# Patient Record
Sex: MALE | Race: BLACK OR AFRICAN AMERICAN | Employment: FULL TIME | URBAN - METROPOLITAN AREA
[De-identification: names, ages, dates, MRNs, and addresses within clinical notes are randomized per-mention and may not be internally consistent; named-entity substitution may affect disease eponyms.]

---

## 2017-07-08 ENCOUNTER — APPOINTMENT (OUTPATIENT)
Dept: GENERAL RADIOLOGY | Age: 32
End: 2017-07-08
Attending: PHYSICIAN ASSISTANT
Payer: COMMERCIAL

## 2017-07-08 ENCOUNTER — HOSPITAL ENCOUNTER (EMERGENCY)
Age: 32
Discharge: HOME | End: 2017-07-08
Attending: EMERGENCY MEDICINE
Payer: COMMERCIAL

## 2017-07-08 VITALS
WEIGHT: 252.43 LBS | OXYGEN SATURATION: 95 % | TEMPERATURE: 97.9 F | BODY MASS INDEX: 38.38 KG/M2 | HEART RATE: 102 BPM | RESPIRATION RATE: 16 BRPM | DIASTOLIC BLOOD PRESSURE: 94 MMHG | SYSTOLIC BLOOD PRESSURE: 139 MMHG
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
## 2017-07-08 DIAGNOSIS — B35.3 TINEA PEDIS OF LEFT FOOT: ICD-10-CM

## 2017-07-08 LAB
ALBUMIN SERPL BCP-MCNC: 3.8 G/DL (ref 3.5–5)
ALBUMIN/GLOB SERPL: 1 {RATIO} (ref 1.1–2.2)
ALP SERPL-CCNC: 98 U/L (ref 45–117)
ALT SERPL-CCNC: 40 U/L (ref 12–78)
ANION GAP BLD CALC-SCNC: 5 MMOL/L (ref 5–15)
AST SERPL W P-5'-P-CCNC: 21 U/L (ref 15–37)
BASOPHILS # BLD AUTO: 0 K/UL (ref 0–0.1)
BASOPHILS # BLD: 0 % (ref 0–1)
BILIRUB SERPL-MCNC: 0.3 MG/DL (ref 0.2–1)
BUN SERPL-MCNC: 11 MG/DL (ref 6–20)
BUN/CREAT SERPL: 9 (ref 12–20)
CALCIUM SERPL-MCNC: 8.7 MG/DL (ref 8.5–10.1)
CHLORIDE SERPL-SCNC: 103 MMOL/L (ref 97–108)
CO2 SERPL-SCNC: 30 MMOL/L (ref 21–32)
CREAT SERPL-MCNC: 1.25 MG/DL (ref 0.7–1.3)
EOSINOPHIL # BLD: 0.2 K/UL (ref 0–0.4)
EOSINOPHIL NFR BLD: 5 % (ref 0–7)
ERYTHROCYTE [DISTWIDTH] IN BLOOD BY AUTOMATED COUNT: 14.6 % (ref 11.5–14.5)
GLOBULIN SER CALC-MCNC: 4 G/DL (ref 2–4)
GLUCOSE SERPL-MCNC: 124 MG/DL (ref 65–100)
HCT VFR BLD AUTO: 38.9 % (ref 36.6–50.3)
HGB BLD-MCNC: 12.9 G/DL (ref 12.1–17)
LYMPHOCYTES # BLD AUTO: 43 % (ref 12–49)
LYMPHOCYTES # BLD: 2.2 K/UL (ref 0.8–3.5)
MCH RBC QN AUTO: 27.7 PG (ref 26–34)
MCHC RBC AUTO-ENTMCNC: 33.2 G/DL (ref 30–36.5)
MCV RBC AUTO: 83.5 FL (ref 80–99)
MONOCYTES # BLD: 0.5 K/UL (ref 0–1)
MONOCYTES NFR BLD AUTO: 9 % (ref 5–13)
NEUTS SEG # BLD: 2.2 K/UL (ref 1.8–8)
NEUTS SEG NFR BLD AUTO: 43 % (ref 32–75)
PLATELET # BLD AUTO: 294 K/UL (ref 150–400)
POTASSIUM SERPL-SCNC: 3.6 MMOL/L (ref 3.5–5.1)
PROT SERPL-MCNC: 7.8 G/DL (ref 6.4–8.2)
RBC # BLD AUTO: 4.66 M/UL (ref 4.1–5.7)
SODIUM SERPL-SCNC: 138 MMOL/L (ref 136–145)
WBC # BLD AUTO: 5.1 K/UL (ref 4.1–11.1)

## 2017-07-08 PROCEDURE — 80053 COMPREHEN METABOLIC PANEL: CPT | Performed by: PHYSICIAN ASSISTANT

## 2017-07-08 PROCEDURE — 90715 TDAP VACCINE 7 YRS/> IM: CPT | Performed by: PHYSICIAN ASSISTANT

## 2017-07-08 PROCEDURE — 75810000289 HC I&D ABSCESS SIMP/COMP/MULT

## 2017-07-08 PROCEDURE — 77030019895 HC PCKNG STRP IODO -A

## 2017-07-08 PROCEDURE — 85025 COMPLETE CBC W/AUTO DIFF WBC: CPT | Performed by: PHYSICIAN ASSISTANT

## 2017-07-08 PROCEDURE — 90471 IMMUNIZATION ADMIN: CPT

## 2017-07-08 PROCEDURE — 99282 EMERGENCY DEPT VISIT SF MDM: CPT

## 2017-07-08 PROCEDURE — 96365 THER/PROPH/DIAG IV INF INIT: CPT

## 2017-07-08 PROCEDURE — 36415 COLL VENOUS BLD VENIPUNCTURE: CPT | Performed by: PHYSICIAN ASSISTANT

## 2017-07-08 PROCEDURE — 74011250636 HC RX REV CODE- 250/636: Performed by: PHYSICIAN ASSISTANT

## 2017-07-08 PROCEDURE — 96372 THER/PROPH/DIAG INJ SC/IM: CPT

## 2017-07-08 PROCEDURE — 96375 TX/PRO/DX INJ NEW DRUG ADDON: CPT

## 2017-07-08 PROCEDURE — 73630 X-RAY EXAM OF FOOT: CPT

## 2017-07-08 PROCEDURE — 74011000258 HC RX REV CODE- 258: Performed by: PHYSICIAN ASSISTANT

## 2017-07-08 RX ORDER — CHLORPHENIRAMINE MALEATE 4 MG
TABLET ORAL 2 TIMES DAILY
Qty: 1 TUBE | Refills: 0 | Status: SHIPPED | OUTPATIENT
Start: 2017-07-08 | End: 2017-08-07

## 2017-07-08 RX ORDER — PROCHLORPERAZINE EDISYLATE 5 MG/ML
10 INJECTION INTRAMUSCULAR; INTRAVENOUS
Status: COMPLETED | OUTPATIENT
Start: 2017-07-08 | End: 2017-07-08

## 2017-07-08 RX ORDER — OXYCODONE AND ACETAMINOPHEN 5; 325 MG/1; MG/1
1 TABLET ORAL
Qty: 12 TAB | Refills: 0 | Status: SHIPPED | OUTPATIENT
Start: 2017-07-08 | End: 2018-02-12 | Stop reason: ALTCHOICE

## 2017-07-08 RX ADMIN — TETANUS TOXOID, REDUCED DIPHTHERIA TOXOID AND ACELLULAR PERTUSSIS VACCINE, ADSORBED 0.5 ML: 5; 2.5; 8; 8; 2.5 SUSPENSION INTRAMUSCULAR at 12:23

## 2017-07-08 RX ADMIN — CEFTRIAXONE 1 G: 1 INJECTION, POWDER, FOR SOLUTION INTRAMUSCULAR; INTRAVENOUS at 12:52

## 2017-07-08 RX ADMIN — PROCHLORPERAZINE EDISYLATE 10 MG: 5 INJECTION INTRAMUSCULAR; INTRAVENOUS at 12:52

## 2017-07-08 RX ADMIN — METHYLPREDNISOLONE SODIUM SUCCINATE 125 MG: 125 INJECTION, POWDER, FOR SOLUTION INTRAMUSCULAR; INTRAVENOUS at 12:24

## 2017-07-08 NOTE — ED NOTES
ACE wrap applied by PA; VSS; neuro intact; received discharge instructions from PA;declined wheelchair assistance to car

## 2017-07-08 NOTE — LETTER
[address] 
[facility] EMERGENCY DEPT 
[address] [id number] [phone] Work/School Note Date: 7/8/2017 To Whom It May concern: 
 
Josef Beaulieu was seen and treated today in the emergency room by the following provider(s): 
Attending Provider: Ben Hightower. Yumiko Chacon MD 
Physician Assistant: JERICA Prater. [facility] hours. Sincerely, JERICA Prater

## 2017-07-08 NOTE — DISCHARGE INSTRUCTIONS
Athlete's Foot: Care Instructions  Your Care Instructions  Athlete's foot is an itchy rash on the foot caused by an infection with a fungus. You can get it by going barefoot in wet public areas, such as swimming pools or locker rooms. Many times there is no clear reason why you get athlete's foot. You can easily treat athlete's foot by putting medicine on your feet for 1 to 6 weeks. In some cases, a doctor may prescribe pills to kill the fungus. Follow-up care is a key part of your treatment and safety. Be sure to make and go to all appointments, and call your doctor if you are having problems. It's also a good idea to know your test results and keep a list of the medicines you take. How can you care for yourself at home? · Your doctor may suggest an over-the counter lotion or spray or may prescribe a medicine. Take your medicines exactly as prescribed. Call your doctor if you think you are having a problem with your medicine. · Keep your feet clean and dry. · When you get dressed, put your socks on before your underwear. This can prevent the fungus from spreading from your feet to your groin. To prevent athlete's foot  · Wear flip-flops or other shower sandals in public locker rooms and showers and by the pool. · Dry between your toes after swimming or bathing. · Wear leather shoes or sandals, which let air get to your feet. · Change your socks as needed so your feet stay as dry as possible. · Use antifungal powder on your feet. When should you call for help? Watch closely for changes in your health, and be sure to contact your doctor if:  · You do not get better as expected. Where can you learn more? Go to http://toshia-joe.info/. Enter M498 in the search box to learn more about \"Athlete's Foot: Care Instructions. \"  Current as of: October 13, 2016  Content Version: 11.3  © 6284-9028 [employer].  Care instructions adapted under license by [employer] (which disclaims liability or warranty for this information). If you have questions about a medical condition or this instruction, always ask your healthcare professional. [address] any warranty or liability for your use of this information.

## 2017-07-08 NOTE — ED PROVIDER NOTES
HPI Comments: Carrie Ruvalcaba, 28 y.o. male, with no significant PMHx presents ambulatory to [facility] ED with cc of insect bite to left foot present since 4 days ago. Pt states that the bite was originally a blister but has since \"popped\". He notes increased pain upon ambulating. He remarks clear discharge from the area. He notes that he went to [facility] and received TMP/SMX antibiotics, for which he is on day 2. He is not UTD on his tetanus shot. PCP: None    Social history significant for: + Tobacco, - EtOH, + Illicit Drug Use    There are no other complaints, changes, or physical findings at this time. The history is provided by the patient. No  was used. Past Medical History:   Diagnosis Date    Headache        No past surgical history on file. Family History:   Problem Relation Age of Onset    Hypertension Mother     Hypertension Father     No Known Problems Sister     No Known Problems Brother        Social History     Social History    Marital status: SINGLE     Spouse name: N/A    Number of children: N/A    Years of education: N/A     Occupational History    Not on file. Social History Main Topics    Smoking status: Current Every Day Smoker    Smokeless tobacco: Never Used    Alcohol use No      Comment: Rarely. Smokes cigar's one a day    Drug use: Yes     Special: Marijuana      Comment: once a day    Sexual activity: Yes     Partners: Female     Other Topics Concern    Not on file     Social History Narrative    No narrative on file         ALLERGIES: Review of patient's allergies indicates no known allergies. Review of Systems   Constitutional: Negative for activity change, chills and fever. HENT: Negative for congestion and sore throat. Eyes: Negative for pain and redness. Respiratory: Negative for cough, chest tightness and shortness of breath. Cardiovascular: Negative for chest pain and palpitations.    Gastrointestinal: Negative for abdominal pain, diarrhea, nausea and vomiting. Genitourinary: Negative for dysuria, frequency and urgency. Musculoskeletal: Negative for back pain and neck pain. Skin: Positive for wound (blister-like insect bite on left foot). Negative for rash. Neurological: Negative for syncope, light-headedness and headaches. Psychiatric/Behavioral: Negative for confusion. All other systems reviewed and are negative. Patient Vitals for the past 12 hrs:   Temp Pulse Resp BP SpO2   07/08/17 1210 97.9 °F (36.6 °C) (!) 102 16 (!) 139/94 95 %       Physical Exam   Constitutional: He is oriented to person, place, and time. He appears well-developed and well-nourished. No distress. HENT:   Head: Normocephalic and atraumatic. Right Ear: External ear normal.   Left Ear: External ear normal.   Nose: Nose normal.   Mouth/Throat: Oropharynx is clear and moist. No oropharyngeal exudate. Eyes: Conjunctivae and EOM are normal. Pupils are equal, round, and reactive to light. Right eye exhibits no discharge. Left eye exhibits no discharge. No scleral icterus. Neck: Normal range of motion. Neck supple. No JVD present. No tracheal deviation present. Cardiovascular: Normal rate, regular rhythm, normal heart sounds and intact distal pulses. Exam reveals no gallop and no friction rub. No murmur heard. Pulmonary/Chest: Effort normal and breath sounds normal. No respiratory distress. He has no wheezes. He has no rales. He exhibits no tenderness. Abdominal: Soft. Bowel sounds are normal. He exhibits no distension and no mass. There is no tenderness. There is no rebound and no guarding. Musculoskeletal: Normal range of motion. He exhibits no edema or tenderness. Lymphadenopathy:     He has no cervical adenopathy. Neurological: He is alert and oriented to person, place, and time. He has normal reflexes. No cranial nerve deficit. He exhibits normal muscle tone.  Coordination normal.   Skin: Skin is warm and dry. He is not diaphoretic.   + Unroofed blister on dorsal aspect of left distal foot  + Swelling  No cellulitis, no drainage, no tissue necrosis  + Interdigital tinea pedis, between toes at the bases. Psychiatric: He has a normal mood and affect. His behavior is normal. Judgment and thought content normal.   Nursing note and vitals reviewed. MDM  Number of Diagnoses or Management Options  Insect bite, initial encounter:   Tinea pedis of left foot:   Diagnosis management comments: DDx: insect bite, blister, tinea pedis, secondary infection. Amount and/or Complexity of Data Reviewed  Clinical lab tests: ordered and reviewed  Tests in the radiology section of CPT®: ordered and reviewed    Patient Progress  Patient progress: stable    ED Course       Procedures     Procedure Note - Incision and Drainage:   1:32 PM  Performed by: Kendrick Wheeler PA-C   Complexity: simple  Skin prepped with Hibiclens. Sterile field established. Anesthesia achieved with lidocaine plain. Abscess to foot: left was incised with # 11 blade, and small amount of green drainage was expressed. Wound probed and irrigated. Area was packed using quarter inch iodoform gauze. Sterile dressing applied. Estimated blood loss: less than 5cc  The procedure took 1-15 minutes, and pt tolerated well. Written by Nicole Valentine, ED Scribe, as dictated by Kendrick Wheeler PA-C. Procedure Note - Ace Wrap Placement:  1:32 PM  Performed by: Kendrick Wheeler PA-C   Neurovascularly intact prior to tx. An Ace Wrap was placed on pt's left foot. Joint was placed in neutral position. Neurovascularly intact after tx. The procedure took 1-15 minutes, and pt tolerated well.   Written by Nicole Valentine ED Scribe, as dictated by Kendrick Wheeler PA-C.    LABORATORY TESTS:  Recent Results (from the past 12 hour(s))   CBC WITH AUTOMATED DIFF    Collection Time: 07/08/17 12:20 PM   Result Value Ref Range    WBC 5.1 4.1 - 11.1 K/uL RBC 4.66 4.10 - 5.70 M/uL    HGB 12.9 12.1 - 17.0 g/dL    HCT 38.9 36.6 - 50.3 %    MCV 83.5 80.0 - 99.0 FL    MCH 27.7 26.0 - 34.0 PG    MCHC 33.2 30.0 - 36.5 g/dL    RDW 14.6 (H) 11.5 - 14.5 %    PLATELET 815 129 - 288 K/uL    NEUTROPHILS 43 32 - 75 %    LYMPHOCYTES 43 12 - 49 %    MONOCYTES 9 5 - 13 %    EOSINOPHILS 5 0 - 7 %    BASOPHILS 0 0 - 1 %    ABS. NEUTROPHILS 2.2 1.8 - 8.0 K/UL    ABS. LYMPHOCYTES 2.2 0.8 - 3.5 K/UL    ABS. MONOCYTES 0.5 0.0 - 1.0 K/UL    ABS. EOSINOPHILS 0.2 0.0 - 0.4 K/UL    ABS. BASOPHILS 0.0 0.0 - 0.1 K/UL   METABOLIC PANEL, COMPREHENSIVE    Collection Time: 07/08/17 12:20 PM   Result Value Ref Range    Sodium 138 136 - 145 mmol/L    Potassium 3.6 3.5 - 5.1 mmol/L    Chloride 103 97 - 108 mmol/L    CO2 30 21 - 32 mmol/L    Anion gap 5 5 - 15 mmol/L    Glucose 124 (H) 65 - 100 mg/dL    BUN 11 6 - 20 MG/DL    Creatinine 1.25 0.70 - 1.30 MG/DL    BUN/Creatinine ratio 9 (L) 12 - 20      GFR est AA >60 >60 ml/min/1.73m2    GFR est non-AA >60 >60 ml/min/1.73m2    Calcium 8.7 8.5 - 10.1 MG/DL    Bilirubin, total 0.3 0.2 - 1.0 MG/DL    ALT (SGPT) 40 12 - 78 U/L    AST (SGOT) 21 15 - 37 U/L    Alk. phosphatase 98 45 - 117 U/L    Protein, total 7.8 6.4 - 8.2 g/dL    Albumin 3.8 3.5 - 5.0 g/dL    Globulin 4.0 2.0 - 4.0 g/dL    A-G Ratio 1.0 (L) 1.1 - 2.2         IMAGING RESULTS:  XR FOOT LT MIN 3 V   Final Result   INDICATION: ulcer l foot dorsal and distal      Exam: AP, lateral, oblique views of the left foot.     FINDINGS: There is no acute fracture or dislocation. There is no plain  radiographic evidence of osteomyelitis. Bones are well-mineralized. No gas is  seen within the soft tissues. No radiodense foreign body is seen.     IMPRESSION  IMPRESSION: No acute fracture or dislocation. MR can be performed for further  evaluation, if indicated.        MEDICATIONS GIVEN:  Medications   diph,Pertuss(AC),Tet Vac-PF (BOOSTRIX) suspension 0.5 mL (0.5 mL IntraMUSCular Given 7/8/17 1223) cefTRIAXone (ROCEPHIN) 1 g in 0.9% sodium chloride (MBP/ADV) 50 mL (0 g IntraVENous IV Completed 7/8/17 1335)   methylPREDNISolone (PF) (SOLU-MEDROL) injection 125 mg (125 mg IntraVENous Given 7/8/17 1224)   prochlorperazine (COMPAZINE) injection 10 mg (10 mg IntraMUSCular Given 7/8/17 1252)       IMPRESSION:  1. Insect bite, initial encounter    2. Tinea pedis of left foot        PLAN:  1. Discharge   Current Discharge Medication List      START taking these medications    Details   oxyCODONE-acetaminophen (PERCOCET) 5-325 mg per tablet Take 1 Tab by mouth every six (6) hours as needed for Pain. Max Daily Amount: 4 Tabs. Qty: 12 Tab, Refills: 0      clotrimazole (LOTRIMIN) 1 % topical cream Apply  to affected area two (2) times a day for 30 days. Apply twice a day for 2-4 weeks  Qty: 1 Tube, Refills: 0           2. Follow-up Information     Follow up With Details Comments Contact Info    None In 2 days As needed None ([phone]) Patient stated that they have no PCP      Edwin Abraham DPM In 2 days For wound re-check [address] [place] [address]  [phone]          Return to ED if worse     Discharge Note:  1:42 PM  The pt is ready for discharge. The pt's signs, symptoms, diagnosis, and discharge instructions have been discussed and pt has conveyed their understanding. The pt is to follow up as recommended or return to ER should their symptoms worsen. Plan has been discussed and pt is in agreement. This note is prepared by Rachael Jalloh, acting as a Scribe for [employer] . [employer] : The scribe's documentation has been prepared under my direction and personally reviewed by me in its entirety. I confirm that the notes above accurately reflects all work, treatment, procedures, and medical decision making performed by me.

## 2018-02-12 ENCOUNTER — OFFICE VISIT (OUTPATIENT)
Dept: FAMILY MEDICINE CLINIC | Age: 33
End: 2018-02-12

## 2018-02-12 VITALS
TEMPERATURE: 97.1 F | HEIGHT: 68 IN | SYSTOLIC BLOOD PRESSURE: 130 MMHG | BODY MASS INDEX: 40.47 KG/M2 | HEART RATE: 82 BPM | OXYGEN SATURATION: 100 % | WEIGHT: 267 LBS | DIASTOLIC BLOOD PRESSURE: 88 MMHG | RESPIRATION RATE: 20 BRPM

## 2018-02-12 DIAGNOSIS — Z13.29 SCREENING FOR THYROID DISORDER: ICD-10-CM

## 2018-02-12 DIAGNOSIS — Z13.1 SCREENING FOR DIABETES MELLITUS (DM): ICD-10-CM

## 2018-02-12 DIAGNOSIS — E55.9 HYPOVITAMINOSIS D: ICD-10-CM

## 2018-02-12 DIAGNOSIS — Z13.220 SCREENING CHOLESTEROL LEVEL: ICD-10-CM

## 2018-02-12 DIAGNOSIS — Z11.3 SCREEN FOR STD (SEXUALLY TRANSMITTED DISEASE): ICD-10-CM

## 2018-02-12 DIAGNOSIS — E66.01 OBESITY, MORBID (HCC): ICD-10-CM

## 2018-02-12 DIAGNOSIS — L73.9 FOLLICULITIS: ICD-10-CM

## 2018-02-12 DIAGNOSIS — R35.0 FREQUENCY OF URINATION: ICD-10-CM

## 2018-02-12 DIAGNOSIS — L02.93 RECURRENT BOILS: ICD-10-CM

## 2018-02-12 DIAGNOSIS — Z00.00 ENCOUNTER FOR ANNUAL PHYSICAL EXAM: Primary | ICD-10-CM

## 2018-02-12 LAB
BILIRUB UR QL STRIP: NEGATIVE
GLUCOSE UR-MCNC: NEGATIVE MG/DL
KETONES P FAST UR STRIP-MCNC: NEGATIVE MG/DL
PH UR STRIP: 7 [PH] (ref 4.6–8)
PROT UR QL STRIP: NEGATIVE
SP GR UR STRIP: 1.02 (ref 1–1.03)
UA UROBILINOGEN AMB POC: NORMAL (ref 0.2–1)
URINALYSIS CLARITY POC: CLEAR
URINALYSIS COLOR POC: YELLOW
URINE BLOOD POC: NEGATIVE
URINE LEUKOCYTES POC: NEGATIVE
URINE NITRITES POC: NEGATIVE

## 2018-02-12 RX ORDER — CEPHALEXIN 500 MG/1
500 CAPSULE ORAL 2 TIMES DAILY
Qty: 20 CAP | Refills: 0 | Status: SHIPPED | OUTPATIENT
Start: 2018-02-12 | End: 2018-02-22

## 2018-02-12 NOTE — MR AVS SNAPSHOT
[address] [facility] [address] [facility] [address]. 
[phone] Patient: Linette Barry MRN: [id number] VCM:5/93[birth year]06 Visit Information Date & Time Provider Department Dept. Phone Encounter #  
 2/12/2018  8:45 AM Robina Collazo MD [facility] at [address] [place] [address] Road [id number] Follow-up Instructions Return 2-3 weeks, for f/u treatment and results. Upcoming Health Maintenance Date Due DTaP/Tdap/Td series (2 - Td) 7/8/2027 Allergies as of 2/12/2018  Review Complete On: 2/12/2018 By: Robina Collazo MD  
 No Known Allergies Current Immunizations  Never Reviewed Name Date Tdap 7/8/2017 12:23 PM  
  
 Not reviewed this visit You Were Diagnosed With   
  
 Codes Comments Encounter for annual physical exam    -  Primary ICD-10-CM: Z00.00 ICD-9-CM: V70.0 Obesity, morbid ([address]     ICD-10-CM: E66.01 
ICD-9-CM: 278.01 Screening for thyroid disorder     ICD-10-CM: Z13.29 ICD-9-CM: V77.0 Screening cholesterol level     ICD-10-CM: D54.695 ICD-9-CM: V77.91 Hypovitaminosis D     ICD-10-CM: E55.9 ICD-9-CM: 268.9 Frequency of urination     ICD-10-CM: R35.0 ICD-9-CM: 788.41 Screen for STD (sexually transmitted disease)     ICD-10-CM: Z11.3 ICD-9-CM: V74.5 Screening for diabetes mellitus (DM)     ICD-10-CM: Z13.1 ICD-9-CM: V77.1 Folliculitis     ZST-08-XH: L73.9 ICD-9-CM: 704.8 Recurrent boils     ICD-10-CM: L02.93 
ICD-9-CM: 680.9 Vitals BP Pulse Temp Resp Height(growth percentile) Weight(growth percentile) 130/88 82 97.1 °F (36.2 °C) (Oral) 20 5' 8\" (1.727 m) 267 lb (121.1 kg) SpO2 BMI Smoking Status 100% 40.6 kg/m2 Current Some Day Smoker Vitals History BMI and BSA Data Body Mass Index Body Surface Area  
 40.6 kg/m 2 2.41 m 2 Preferred Pharmacy Pharmacy Name Phone [address] [facility] [phone] Your Updated Medication List  
  
   
This list is accurate as of: 2/12/18 10:07 AM.  Always use your most recent med list.  
  
  
  
  
 cephALEXin 500 mg capsule Commonly known as:  Lethaniel Kin Take 1 Cap by mouth two (2) times a day for 10 days. Prescriptions Sent to Pharmacy Refills  
 cephALEXin (KEFLEX) 500 mg capsule 0 Sig: Take 1 Cap by mouth two (2) times a day for 10 days. Class: Normal  
 Pharmacy: [facility] [address] [place], [place] - [address] [facility] #: [phone] Route: Oral  
  
We Performed the Following AMB POC URINALYSIS DIP STICK AUTO W/O MICRO [10329 CPT(R)] CBC WITH AUTOMATED DIFF [99389 CPT(R)] HEMOGLOBIN A1C WITH EAG [21076 CPT(R)] LIPID PANEL [74900 CPT(R)] METABOLIC PANEL, COMPREHENSIVE [42857 CPT(R)] TSH 3RD GENERATION [54444 CPT(R)] VITAMIN D, 25 HYDROXY [id number] CPT(R)] Follow-up Instructions Return 2-3 weeks, for f/u treatment and results. Patient Instructions Skin Abscess: After Your Visit to the Emergency Room Your Care Instructions You were seen in the emergency room because you had a skin abscess. This is a bacterial infection that forms a pocket of pus. It is also known as a \"boil. \" The doctor may have cut a small opening in the abscess so that the pus can drain out. You may have gauze in the cut so that the abscess will stay open and keep draining. You may need antibiotics. You will need to follow up with your doctor to make sure the infection has gone away. Even though you have been released from the emergency room, you still need to watch for any problems. The doctor carefully checked you. But sometimes problems can develop later.  If you have new symptoms, or if your symptoms do not get better, return to the emergency room or call your doctor right away. A visit to the emergency room is only one step in your treatment. Even if you feel better, you still need to do what your doctor recommends, such as going to all suggested follow-up appointments and taking medicines exactly as directed. This will help you recover and help prevent future problems. How can you care for yourself at home? · If you were given antibiotics, take them as directed. Do not stop taking them just because you feel better. You need to take the full course of antibiotics. · Take pain medicines exactly as directed. If the doctor gave you a prescription medicine for pain, take it as prescribed. If you are not taking a prescription pain medicine, ask your doctor if you can take an over-the-counter medicine. · Apply a heating pad set on low or a hot water bottle 3 or 4 times a day for pain. Keep a cloth between the heat source and your skin. · Keep your bandage clean and dry. Change the bandage whenever it gets wet or dirty, or at least one time a day. · If the abscess was packed with gauze: 
¨ Keep follow-up appointments to have the gauze changed or removed. If your doctor instructed you to remove the gauze, gently pull out all of the gauze when your doctor tells you to. ¨ After the gauze is removed, soak the area in warm water for 15 to 20 minutes two times a day, until the wound closes. When should you call for help? Call your doctor today if: 
· You think the abscess is getting worse. · You have a new or higher fever. · Your pain gets worse. · You have any problems with the gauze in your abscess. Where can you learn more? Go to [employer] Enter M703 in the search box to learn more about \"Skin Abscess: After Your Visit to the Emergency Room. \"  
© 0441-2200 [employer]wise, Incorporated.  Care instructions adapted under license by [employer] Calabrese (which disclaims liability or warranty for this information). This care instruction is for use with your licensed healthcare professional. If you have questions about a medical condition or this instruction, always ask your healthcare professional. [address] any warranty or liability for your use of this information. Content Version: 9.4.00160; Last Revised: September 29, 2010 Introducing [facility]! [employer] Cal[employer] introduces N3TWORK patient portal. Now you can access parts of your medical record, email your doctor's office, and request medication refills online. 1. In your internet browser, go to [email or web address] 2. Click on the First Time User? Click Here link in the Sign In box. You will see the New Member Sign Up page. 3. Enter your N3TWORK Access Code exactly as it appears below. You will not need to use this code after youve completed the sign-up process. If you do not sign up before the expiration date, you must request a new code. · N3TWORK Access Code: [email or web address] Expires: 5/13/2018 10:07 AM 
 
4. Enter the last four digits of your Social Security Number (xxxx) and Date of Birth (mm/dd/yyyy) as indicated and click Submit. You will be taken to the next sign-up page. 5. Create a N3TWORK ID. This will be your N3TWORK login ID and cannot be changed, so think of one that is secure and easy to remember. 6. Create a N3TWORK password. You can change your password at any time. 7. Enter your Password Reset Question and Answer. This can be used at a later time if you forget your password. 8. Enter your e-mail address. You will receive e-mail notification when new information is available in [address] 9. Click Sign Up. You can now view and download portions of your medical record. 10. Click the Download Summary menu link to download a portable copy of your medical information. If you have questions, please visit the Frequently Asked Questions section of the [employer] website. Remember, [employer] is NOT to be used for urgent needs. For medical emergencies, dial 911. Now available from your iPhone and Android! Please provide this summary of care documentation to your next provider. Your primary care clinician is listed as Jose Daniel Salter. If you have any questions after today's visit, please call [phone].

## 2018-02-12 NOTE — PROGRESS NOTES
Chief Complaint   Patient presents with    [facility]     Subjective:  Jovani Mauro  is a 28 y.o. AA male presents to establish care for his annual check up. Patient has skin abscess on trunk and armpit. ROS:    Feeling well  No dyspnea or chest pain on exertion  No abdominal pain, change in bowel habits, black or bloody stools  No urinary tract or prostatic symptoms  No neurological complaints. Specific concerns today:  hair follicus infection of armpit    Patient Active Problem List   Diagnosis Code    Generalized headaches R51    Obesity, morbid ([facility] [address] E66.01     Current Outpatient Prescriptions   Medication Sig Dispense Refill    cephALEXin (KEFLEX) 500 mg capsule Take 1 Cap by mouth two (2) times a day for 10 days. 20 Cap 0     No Known Allergies  Past Medical History:   Diagnosis Date    Headache      History reviewed. No pertinent surgical history. Objective:  Blood pressure 130/88, pulse 82, temperature 97.1 °F (36.2 °C), temperature source Oral, resp. rate 20, height 5' 8\" (1.727 m), weight 267 lb (121.1 kg), SpO2 100 %. Patient appears well, alert, oriented x 3, in no distress. ENT normal.  Neck supple. No adenopathy or thyromegaly. PERRL. Lungs are clear, good air entry, no wheezes, rhonchi or rales  CVS: S1 and S2 normal, no murmurs, regular rate and rhythm  Abdomen is soft without tenderness, guarding, mass or organomegaly   exam: deferred. Extremities show no edema, normal peripheral pulses. Neurological without focal findings.     Assessment/Plan:  Diagnoses and all orders for this visit:    Encounter for annual physical exam  -     METABOLIC PANEL, COMPREHENSIVE  -     CBC WITH AUTOMATED DIFF    Obesity, morbid ([facility] [address]    Screening for thyroid disorder  -     TSH 3RD GENERATION    Screening cholesterol level  -     LIPID PANEL    Hypovitaminosis D  -     VITAMIN D, 25 HYDROXY    Frequency of urination  -     AMB POC URINALYSIS DIP STICK AUTO W/O MICRO    Screen for STD (sexually transmitted disease)  -     AMB POC URINALYSIS DIP STICK AUTO W/O MICRO    Screening for diabetes mellitus (DM)  -     HEMOGLOBIN D7P WITH EAG    Folliculitis  -     cephALEXin (KEFLEX) 500 mg capsule; Take 1 Cap by mouth two (2) times a day for 10 days. , Normal, Disp-20 Cap, R-0    Recurrent boils  -     cephALEXin (KEFLEX) 500 mg capsule; Take 1 Cap by mouth two (2) times a day for 10 days. , Normal, Disp-20 Cap, R-0      Patient Instructions       Skin Abscess: After Your Visit to the Emergency Room  Your Care Instructions  You were seen in the emergency room because you had a skin abscess. This is a bacterial infection that forms a pocket of pus. It is also known as a \"boil. \"  The doctor may have cut a small opening in the abscess so that the pus can drain out. You may have gauze in the cut so that the abscess will stay open and keep draining. You may need antibiotics. You will need to follow up with your doctor to make sure the infection has gone away. Even though you have been released from the emergency room, you still need to watch for any problems. The doctor carefully checked you. But sometimes problems can develop later. If you have new symptoms, or if your symptoms do not get better, return to the emergency room or call your doctor right away. A visit to the emergency room is only one step in your treatment. Even if you feel better, you still need to do what your doctor recommends, such as going to all suggested follow-up appointments and taking medicines exactly as directed. This will help you recover and help prevent future problems. How can you care for yourself at home? · If you were given antibiotics, take them as directed. Do not stop taking them just because you feel better. You need to take the full course of antibiotics. · Take pain medicines exactly as directed. If the doctor gave you a prescription medicine for pain, take it as prescribed.  If you are not taking a prescription pain medicine, ask your doctor if you can take an over-the-counter medicine. · Apply a heating pad set on low or a hot water bottle 3 or 4 times a day for pain. Keep a cloth between the heat source and your skin. · Keep your bandage clean and dry. Change the bandage whenever it gets wet or dirty, or at least one time a day. · If the abscess was packed with gauze:  ¨ Keep follow-up appointments to have the gauze changed or removed. If your doctor instructed you to remove the gauze, gently pull out all of the gauze when your doctor tells you to. ¨ After the gauze is removed, soak the area in warm water for 15 to 20 minutes two times a day, until the wound closes. When should you call for help? Call your doctor today if:  · You think the abscess is getting worse. · You have a new or higher fever. · Your pain gets worse. · You have any problems with the gauze in your abscess. Where can you learn more? Go to [employer]  Enter M703 in the search box to learn more about \"Skin Abscess: After Your Visit to the Emergency Room. \"   © 7265-5127 Healthwise, Incorporated. Care instructions adapted under license by [employer] (which disclaims liability or warranty for this information). This care instruction is for use with your licensed healthcare professional. If you have questions about a medical condition or this instruction, always ask your healthcare professional. Ravinder Prim any warranty or liability for your use of this information. Content Version: 9.4.90097; Last Revised: September 29, 2010              Follow-up Disposition:  Return 2-3 weeks, for f/u treatment and results.

## 2018-02-12 NOTE — PROGRESS NOTES
New patient desiring to establish themselves with a primary care provider  Patient is c/o hair bumps on left side front x on going, patient start shaving to reduce the growth, use warm compress.

## 2018-02-12 NOTE — PATIENT INSTRUCTIONS
Skin Abscess: After Your Visit to the Emergency Room  Your Care Instructions  You were seen in the emergency room because you had a skin abscess. This is a bacterial infection that forms a pocket of pus. It is also known as a \"boil. \"  The doctor may have cut a small opening in the abscess so that the pus can drain out. You may have gauze in the cut so that the abscess will stay open and keep draining. You may need antibiotics. You will need to follow up with your doctor to make sure the infection has gone away. Even though you have been released from the emergency room, you still need to watch for any problems. The doctor carefully checked you. But sometimes problems can develop later. If you have new symptoms, or if your symptoms do not get better, return to the emergency room or call your doctor right away. A visit to the emergency room is only one step in your treatment. Even if you feel better, you still need to do what your doctor recommends, such as going to all suggested follow-up appointments and taking medicines exactly as directed. This will help you recover and help prevent future problems. How can you care for yourself at home? · If you were given antibiotics, take them as directed. Do not stop taking them just because you feel better. You need to take the full course of antibiotics. · Take pain medicines exactly as directed. If the doctor gave you a prescription medicine for pain, take it as prescribed. If you are not taking a prescription pain medicine, ask your doctor if you can take an over-the-counter medicine. · Apply a heating pad set on low or a hot water bottle 3 or 4 times a day for pain. Keep a cloth between the heat source and your skin. · Keep your bandage clean and dry. Change the bandage whenever it gets wet or dirty, or at least one time a day. · If the abscess was packed with gauze:  ¨ Keep follow-up appointments to have the gauze changed or removed.  If your doctor instructed you to remove the gauze, gently pull out all of the gauze when your doctor tells you to. ¨ After the gauze is removed, soak the area in warm water for 15 to 20 minutes two times a day, until the wound closes. When should you call for help? Call your doctor today if:  · You think the abscess is getting worse. · You have a new or higher fever. · Your pain gets worse. · You have any problems with the gauze in your abscess. Where can you learn more? Go to [employer]be  Enter M703 in the search box to learn more about \"Skin Abscess: After Your Visit to the Emergency Room. \"   © 4915-1549 Healthwise, Incorporated. Care instructions adapted under license by [employer] (which disclaims liability or warranty for this information). This care instruction is for use with your licensed healthcare professional. If you have questions about a medical condition or this instruction, always ask your healthcare professional. [place] [address] any warranty or liability for your use of this information. Content Version: 9.4.46121;  Last Revised: September 29, 2010

## 2018-02-13 LAB
25(OH)D3+25(OH)D2 SERPL-MCNC: 11.6 NG/ML (ref 30–100)
ALBUMIN SERPL-MCNC: 4.6 G/DL (ref 3.5–5.5)
ALBUMIN/GLOB SERPL: 1.9 {RATIO} (ref 1.2–2.2)
ALP SERPL-CCNC: 94 IU/L (ref 39–117)
ALT SERPL-CCNC: 31 IU/L (ref 0–44)
AST SERPL-CCNC: 21 IU/L (ref 0–40)
BASOPHILS # BLD AUTO: 0 X10E3/UL (ref 0–0.2)
BASOPHILS NFR BLD AUTO: 0 %
BILIRUB SERPL-MCNC: <0.2 MG/DL (ref 0–1.2)
BUN SERPL-MCNC: 13 MG/DL (ref 6–20)
BUN/CREAT SERPL: 13 (ref 9–20)
CALCIUM SERPL-MCNC: 9.6 MG/DL (ref 8.7–10.2)
CHLORIDE SERPL-SCNC: 101 MMOL/L (ref 96–106)
CHOLEST SERPL-MCNC: 212 MG/DL (ref 100–199)
CO2 SERPL-SCNC: 27 MMOL/L (ref 18–29)
CREAT SERPL-MCNC: 1.02 MG/DL (ref 0.76–1.27)
EOSINOPHIL # BLD AUTO: 0.2 X10E3/UL (ref 0–0.4)
EOSINOPHIL NFR BLD AUTO: 3 %
ERYTHROCYTE [DISTWIDTH] IN BLOOD BY AUTOMATED COUNT: 15.4 % (ref 12.3–15.4)
EST. AVERAGE GLUCOSE BLD GHB EST-MCNC: 131 MG/DL
GFR SERPLBLD CREATININE-BSD FMLA CKD-EPI: 112 ML/MIN/1.73
GFR SERPLBLD CREATININE-BSD FMLA CKD-EPI: 97 ML/MIN/1.73
GLOBULIN SER CALC-MCNC: 2.4 G/DL (ref 1.5–4.5)
GLUCOSE SERPL-MCNC: 101 MG/DL (ref 65–99)
HBA1C MFR BLD: 6.2 % (ref 4.8–5.6)
HCT VFR BLD AUTO: 39.6 % (ref 37.5–51)
HDLC SERPL-MCNC: 38 MG/DL
HGB BLD-MCNC: 12.6 G/DL (ref 13–17.7)
IMM GRANULOCYTES # BLD: 0 X10E3/UL (ref 0–0.1)
IMM GRANULOCYTES NFR BLD: 0 %
LDLC SERPL CALC-MCNC: 151 MG/DL (ref 0–99)
LYMPHOCYTES # BLD AUTO: 2.2 X10E3/UL (ref 0.7–3.1)
LYMPHOCYTES NFR BLD AUTO: 42 %
MCH RBC QN AUTO: 27.6 PG (ref 26.6–33)
MCHC RBC AUTO-ENTMCNC: 31.8 G/DL (ref 31.5–35.7)
MCV RBC AUTO: 87 FL (ref 79–97)
MONOCYTES # BLD AUTO: 0.8 X10E3/UL (ref 0.1–0.9)
MONOCYTES NFR BLD AUTO: 14 %
NEUTROPHILS # BLD AUTO: 2.2 X10E3/UL (ref 1.4–7)
NEUTROPHILS NFR BLD AUTO: 41 %
PLATELET # BLD AUTO: 323 X10E3/UL (ref 150–379)
POTASSIUM SERPL-SCNC: 4.6 MMOL/L (ref 3.5–5.2)
PROT SERPL-MCNC: 7 G/DL (ref 6–8.5)
RBC # BLD AUTO: 4.57 X10E6/UL (ref 4.14–5.8)
SODIUM SERPL-SCNC: 140 MMOL/L (ref 134–144)
TRIGL SERPL-MCNC: 117 MG/DL (ref 0–149)
TSH SERPL DL<=0.005 MIU/L-ACNC: 2.36 UIU/ML (ref 0.45–4.5)
VLDLC SERPL CALC-MCNC: 23 MG/DL (ref 5–40)
WBC # BLD AUTO: 5.4 X10E3/UL (ref 3.4–10.8)

## 2018-03-05 ENCOUNTER — OFFICE VISIT (OUTPATIENT)
Dept: FAMILY MEDICINE CLINIC | Age: 33
End: 2018-03-05

## 2018-03-05 VITALS
OXYGEN SATURATION: 98 % | RESPIRATION RATE: 20 BRPM | HEIGHT: 68 IN | BODY MASS INDEX: 39.1 KG/M2 | HEART RATE: 79 BPM | WEIGHT: 258 LBS | DIASTOLIC BLOOD PRESSURE: 80 MMHG | TEMPERATURE: 95.7 F | SYSTOLIC BLOOD PRESSURE: 131 MMHG

## 2018-03-05 DIAGNOSIS — E55.9 HYPOVITAMINOSIS D: Primary | ICD-10-CM

## 2018-03-05 DIAGNOSIS — E78.00 HYPERCHOLESTEROLEMIA: ICD-10-CM

## 2018-03-05 DIAGNOSIS — R73.02 IGT (IMPAIRED GLUCOSE TOLERANCE): ICD-10-CM

## 2018-03-05 DIAGNOSIS — E66.01 OBESITY, MORBID (HCC): ICD-10-CM

## 2018-03-05 RX ORDER — CHOLECALCIFEROL TAB 125 MCG (5000 UNIT) 125 MCG
5000 TAB ORAL DAILY
Qty: 90 TAB | Refills: 1 | Status: SHIPPED | OUTPATIENT
Start: 2018-03-05 | End: 2022-02-18

## 2018-03-05 NOTE — PROGRESS NOTES
Chief Complaint   Patient presents with    Results     Chief Complaint   Patient presents with    Results     HPI:  Mirta Guy is a 35 y.o. AA male is here to review blood work. Serum vit d is below normal. Patient agrees to start supplement  Cholesterol and LDL are elevated. Patient is also mrbidly obese. A1c shows impaired glucose tolerance. Diet and exercise have been encouraged. Medical record reveals that he has lost about 8 lbs since last visit. Review of Systems  As per hpi    Past Medical History:   Diagnosis Date    Headache      History reviewed. No pertinent surgical history. Social History     Social History    Marital status: SINGLE     Spouse name: N/A    Number of children: N/A    Years of education: N/A     Social History Main Topics    Smoking status: Current Some Day Smoker    Smokeless tobacco: Never Used    Alcohol use No      Comment: Rarely.    Smokes cigar's one a day    Drug use: Yes     Special: Marijuana      Comment: once a day    Sexual activity: Yes     Partners: Female     Other Topics Concern    None     Social History Narrative     No Known Allergies    Objective:  Visit Vitals    /80    Pulse 79    Temp 95.7 °F (35.4 °C) (Oral)    Resp 20    Ht 5' 8\" (1.727 m)    Wt 258 lb (117 kg)    SpO2 98%    BMI 39.23 kg/m2     Physical Exam:   General appearance - alert, oriented X 3, obese appearing in no apparent distress  Neck - supple, no significant adenopathy   Chest - clear to auscultation, no wheezes, rales or rhonchi  Heart - normal rate, regular rhythm, normal blood pressure  Abdomen - soft, nontender, nondistended, no organomegaly  Ext-peripheral pulses normal, no pedal edema  Neuro -alert, oriented, normal speech, no focal findings    Results for orders placed or performed in visit on 02/12/18   VITAMIN D, 25 HYDROXY   Result Value Ref Range    VITAMIN D, 25-HYDROXY 11.6 (L) 30.0 - 100.0 ng/mL   LIPID PANEL   Result Value Ref Range    Cholesterol, total 212 (H) 100 - 199 mg/dL    Triglyceride 117 0 - 149 mg/dL    HDL Cholesterol 38 (L) >39 mg/dL    VLDL, calculated 23 5 - 40 mg/dL    LDL, calculated 151 (H) 0 - 99 mg/dL   HEMOGLOBIN A1C WITH EAG   Result Value Ref Range    Hemoglobin A1c 6.2 (H) 4.8 - 5.6 %    Estimated average glucose 372 mg/dL   METABOLIC PANEL, COMPREHENSIVE   Result Value Ref Range    Glucose 101 (H) 65 - 99 mg/dL    BUN 13 6 - 20 mg/dL    Creatinine 1.02 0.76 - 1.27 mg/dL    GFR est non-AA 97 >59 mL/min/1.73    GFR est  >59 mL/min/1.73    BUN/Creatinine ratio 13 9 - 20    Sodium 140 134 - 144 mmol/L    Potassium 4.6 3.5 - 5.2 mmol/L    Chloride 101 96 - 106 mmol/L    CO2 27 18 - 29 mmol/L    Calcium 9.6 8.7 - 10.2 mg/dL    Protein, total 7.0 6.0 - 8.5 g/dL    Albumin 4.6 3.5 - 5.5 g/dL    GLOBULIN, TOTAL 2.4 1.5 - 4.5 g/dL    A-G Ratio 1.9 1.2 - 2.2    Bilirubin, total <0.2 0.0 - 1.2 mg/dL    Alk. phosphatase 94 39 - 117 IU/L    AST (SGOT) 21 0 - 40 IU/L    ALT (SGPT) 31 0 - 44 IU/L   CBC WITH AUTOMATED DIFF   Result Value Ref Range    WBC 5.4 3.4 - 10.8 x10E3/uL    RBC 4.57 4.14 - 5.80 x10E6/uL    HGB 12.6 (L) 13.0 - 17.7 g/dL    HCT 39.6 37.5 - 51.0 %    MCV 87 79 - 97 fL    MCH 27.6 26.6 - 33.0 pg    MCHC 31.8 31.5 - 35.7 g/dL    RDW 15.4 12.3 - 15.4 %    PLATELET 161 327 - 095 x10E3/uL    NEUTROPHILS 41 Not Estab. %    Lymphocytes 42 Not Estab. %    MONOCYTES 14 Not Estab. %    EOSINOPHILS 3 Not Estab. %    BASOPHILS 0 Not Estab. %    ABS. NEUTROPHILS 2.2 1.4 - 7.0 x10E3/uL    Abs Lymphocytes 2.2 0.7 - 3.1 x10E3/uL    ABS. MONOCYTES 0.8 0.1 - 0.9 x10E3/uL    ABS. EOSINOPHILS 0.2 0.0 - 0.4 x10E3/uL    ABS. BASOPHILS 0.0 0.0 - 0.2 x10E3/uL    IMMATURE GRANULOCYTES 0 Not Estab. %    ABS. IMM.  GRANS. 0.0 0.0 - 0.1 x10E3/uL   TSH 3RD GENERATION   Result Value Ref Range    TSH 2.360 0.450 - 4.500 uIU/mL   AMB POC URINALYSIS DIP STICK AUTO W/O MICRO   Result Value Ref Range    Color (UA POC) Yellow     Clarity (UA POC) Clear Glucose (UA POC) Negative Negative    Bilirubin (UA POC) Negative Negative    Ketones (UA POC) Negative Negative    Specific gravity (UA POC) 1.020 1.001 - 1.035    Blood (UA POC) Negative Negative    pH (UA POC) 7.0 4.6 - 8.0    Protein (UA POC) Negative Negative    Urobilinogen (UA POC) 0.2 mg/dL 0.2 - 1    Nitrites (UA POC) Negative Negative    Leukocyte esterase (UA POC) Negative Negative     Assessment/Plan:  Diagnoses and all orders for this visit:    1. Hypovitaminosis D  -     cholecalciferol, VITAMIN D3, (VITAMIN D3) 5,000 unit tab tablet; Take 1 Tab by mouth daily. 2. Hypercholesterolemia    3. IGT (impaired glucose tolerance)    4. Obesity, morbid ([address]      Patient Instructions   Vitamin D (By mouth)   Vitamin D (VYE-ta-min D)  Maintains calcium and phosphorus in your body and makes your bones strong. This medicine is a vitamin. Brand Name(s): Johnson Ng Vitamin D3, Decara, Delta D3, Dialyvite Vitamin D3 Max, Drisdol, Leader Vitamin D3, Ubaldo Nia , Jean Natural Vitamin D, Nature's Blend Super Strength Vitamin D3, Nature's Blend Vitamin D, Nature's Blend Vitamin D3, PharmAssure Vitamin D-3, Algorithmicse Aid Vitamin D-3, [place] Naturals Vitamin D3   There may be other brand names for this medicine. When This Medicine Should Not Be Used: You should not use this medicine if you have had an allergic reaction to vitamin D. How to Use This Medicine:   Tablet, Liquid Filled Capsule  · Your doctor will tell you how much medicine to use. Do not use more than directed. · Follow the instructions on the medicine label if you are using this medicine without a prescription. · It is best to take this medicine with food or milk. · Carefully follow your doctor's instructions about any special diet. If a dose is missed:   · Take a dose as soon as you remember. If it is almost time for your next dose, wait until then and take a regular dose.  Do not take extra medicine to make up for a missed dose.  How to Store and Dispose of This Medicine:   · Store the medicine in a closed container at room temperature, away from heat, moisture, and direct light. · Ask your pharmacist, doctor, or health caregiver about the best way to dispose of any outdated medicine or medicine no longer needed. · Keep all medicine out of the reach of children. Never share your medicine with anyone. Drugs and Foods to Avoid:   Ask your doctor or pharmacist before using any other medicine, including over-the-counter medicines, vitamins, and herbal products. · Make sure your doctor knows about all other medicines you are using. Warnings While Using This Medicine:   · Make sure your doctor knows if you are pregnant or breast feeding. · Make sure your doctor knows if you have kidney stones, sarcoidosis, or overactive parathyroid gland. · You should not use this medicine if you are under 25years of age. Possible Side Effects While Using This Medicine:   Call your doctor right away if you notice any of these side effects:  · Allergic reaction: Itching or hives, swelling in your face or hands, swelling or tingling in your mouth or throat, chest tightness, trouble breathing  · Change in how much or how often you urinate. If you notice these less serious side effects, talk with your doctor:   · Diarrhea. · Headache. · Weight loss. If you notice other side effects that you think are caused by this medicine, tell your doctor. Call your doctor for medical advice about side effects. You may report side effects to FDA at 3-394-FDA-1815  © 2017 [address] Information is for End User's use only and may not be sold, redistributed or otherwise used for commercial purposes. The above information is an  only. It is not intended as medical advice for individual conditions or treatments.  Talk to your doctor, nurse or pharmacist before following any medical regimen to see if it is safe and effective for you.      Follow-up Disposition:  Return 3-4 months, for routine follow up.

## 2018-03-05 NOTE — MR AVS SNAPSHOT
[address] [facility] 
[phone] Patient: Des Bales MRN: [id number] ZTI:[id number] Visit Information Date & Time Provider Department Dept. Phone Encounter #  
 3/5/2018  9:30 AM Thuy Leyva MD [facility] at [address] [id number] Follow-up Instructions Return 3-4 months, for routine follow up. Upcoming Health Maintenance Date Due DTaP/Tdap/Td series (2 - Td) 7/8/2027 Allergies as of 3/5/2018  Review Complete On: 3/5/2018 By: Thuy Leyva MD  
 No Known Allergies Current Immunizations  Never Reviewed Name Date Tdap 7/8/2017 12:23 PM  
  
 Not reviewed this visit You Were Diagnosed With   
  
 Codes Comments Hypovitaminosis D    -  Primary ICD-10-CM: E55.9 ICD-9-CM: 268.9 Hypercholesterolemia     ICD-10-CM: E78.00 ICD-9-CM: 272.0 IGT (impaired glucose tolerance)     ICD-10-CM: R73.02 
ICD-9-CM: 790.22 Obesity, morbid ([address]     ICD-10-CM: E66.01 
ICD-9-CM: 278.01 Vitals BP Pulse Temp Resp Height(growth percentile) Weight(growth percentile) 131/80 79 95.7 °F (35.4 °C) (Oral) 20 5' 8\" (1.727 m) 258 lb (117 kg) SpO2 BMI Smoking Status 98% 39.23 kg/m2 Current Some Day Smoker Vitals History BMI and BSA Data Body Mass Index Body Surface Area  
 39.23 kg/m 2 2.37 m 2 Preferred Pharmacy Pharmacy Name Phone [facility] [phone] Your Updated Medication List  
  
   
This list is accurate as of 3/5/18  9:57 AM.  Always use your most recent med list.  
  
  
  
  
 cholecalciferol (VITAMIN D3) 5,000 unit Tab tablet Commonly known as:  VITAMIN D3 Take 1 Tab by mouth daily. Prescriptions Sent to Pharmacy  Refills  
 cholecalciferol, VITAMIN D3, (VITAMIN D3) 5,000 unit tab tablet 1  
 Sig: Take 1 Tab by mouth daily. Class: Normal  
 Pharmacy: [facility] [address] [place] [address] [address] [facility] #: [phone] Route: Oral  
  
Follow-up Instructions Return 3-4 months, for routine follow up. Patient Instructions Vitamin D (By mouth) Vitamin D (VYE-ta-min D) Maintains calcium and phosphorus in your body and makes your bones strong. This medicine is a vitamin. Brand Name(s): Fritz Cane Vitamin D3, Decara, Delta D3, Dialyvite Vitamin D3 Max, Drisdol, Leader Vitamin D3, Brianna Buff , Jean Natural Vitamin D, Nature's Blend Super Strength Vitamin D3, Nature's Blend Vitamin D, Nature's Blend Vitamin D3, PharmAssure Vitamin D-3, [employer] Vitamin D-3, [place] [employer]aturals Vitamin D3 There may be other brand names for this medicine. When This Medicine Should Not Be Used: You should not use this medicine if you have had an allergic reaction to vitamin D. How to Use This Medicine:  
Tablet, Liquid Filled Capsule · Your doctor will tell you how much medicine to use. Do not use more than directed. · Follow the instructions on the medicine label if you are using this medicine without a prescription. · It is best to take this medicine with food or milk. · Carefully follow your doctor's instructions about any special diet. If a dose is missed: · Take a dose as soon as you remember. If it is almost time for your next dose, wait until then and take a regular dose. Do not take extra medicine to make up for a missed dose. How to Store and Dispose of This Medicine: · Store the medicine in a closed container at room temperature, away from heat, moisture, and direct light. · Ask your pharmacist, doctor, or health caregiver about the best way to dispose of any outdated medicine or medicine no longer needed. · Keep all medicine out of the reach of children. Never share your medicine with anyone. Drugs and Foods to Avoid: Ask your doctor or pharmacist before using any other medicine, including over-the-counter medicines, vitamins, and herbal products. · Make sure your doctor knows about all other medicines you are using. Warnings While Using This Medicine: · Make sure your doctor knows if you are pregnant or breast feeding. · Make sure your doctor knows if you have kidney stones, sarcoidosis, or overactive parathyroid gland. · You should not use this medicine if you are under 25years of age. Possible Side Effects While Using This Medicine:  
Call your doctor right away if you notice any of these side effects: · Allergic reaction: Itching or hives, swelling in your face or hands, swelling or tingling in your mouth or throat, chest tightness, trouble breathing · Change in how much or how often you urinate. If you notice these less serious side effects, talk with your doctor: · Diarrhea. · Headache. · Weight loss. If you notice other side effects that you think are caused by this medicine, tell your doctor. Call your doctor for medical advice about side effects. You may report side effects to FDA at 7-794-FDA-8185 © 2017 [address] Information is for End User's use only and may not be sold, redistributed or otherwise used for commercial purposes. The above information is an  only. It is not intended as medical advice for individual conditions or treatments. Talk to your doctor, nurse or pharmacist before following any medical regimen to see if it is safe and effective for you. Introducing [facility]! [employer] introduces [employer] patient portal. Now you can access parts of your medical record, email your doctor's office, and request medication refills online. 1. In your internet browser, go to [email or web address] [employer] ([email or web address] 2. Click on the First Time User? Click Here link in the Sign In box.  You will see the New Member Sign Up page. 3. Enter your [employer] Access Code exactly as it appears below. You will not need to use this code after youve completed the sign-up process. If you do not sign up before the expiration date, you must request a new code. · [employer] Access Code: [email or web address] Expires: 5/13/2018 10:07 AM 
 
4. Enter the last four digits of your Social Security Number (xxxx) and Date of Birth (mm/dd/yyyy) as indicated and click Submit. You will be taken to the next sign-up page. 5. Create a [employer] ID. This will be your [employer] login ID and cannot be changed, so think of one that is secure and easy to remember. 6. Create a [employer] password. You can change your password at any time. 7. Enter your Password Reset Question and Answer. This can be used at a later time if you forget your password. 8. Enter your e-mail address. You will receive e-mail notification when new information is available in [address] 9. Click Sign Up. You can now view and download portions of your medical record. 10. Click the Download Summary menu link to download a portable copy of your medical information. If you have questions, please visit the Frequently Asked Questions section of the [employer] website. Remember, [employer] is NOT to be used for urgent needs. For medical emergencies, dial 911. Now available from your iPhone and Android! Please provide this summary of care documentation to your next provider. Your primary care clinician is listed as [facility]. If you have any questions after today's visit, please call [phone].

## 2018-03-05 NOTE — PATIENT INSTRUCTIONS
Vitamin D (By mouth)   Vitamin D (VYE-ta-min D)  Maintains calcium and phosphorus in your body and makes your bones strong. This medicine is a vitamin. Brand Name(s): Wali Ruedaelsen Vitamin D3, Decara, Delta D3, Dialyvite Vitamin D3 Max, Drisdol, Leader Vitamin D3, Katina Laity , Jean Natural Vitamin D, Nature's Blend Super Strength Vitamin D3, Nature's Blend Vitamin D, Nature's Blend Vitamin D3, PharmAssure Vitamin D-3, [employer] Aid Vitamin D-3, [place] Naturals Vitamin D3   There may be other brand names for this medicine. When This Medicine Should Not Be Used: You should not use this medicine if you have had an allergic reaction to vitamin D. How to Use This Medicine:   Tablet, Liquid Filled Capsule  · Your doctor will tell you how much medicine to use. Do not use more than directed. · Follow the instructions on the medicine label if you are using this medicine without a prescription. · It is best to take this medicine with food or milk. · Carefully follow your doctor's instructions about any special diet. If a dose is missed:   · Take a dose as soon as you remember. If it is almost time for your next dose, wait until then and take a regular dose. Do not take extra medicine to make up for a missed dose. How to Store and Dispose of This Medicine:   · Store the medicine in a closed container at room temperature, away from heat, moisture, and direct light. · Ask your pharmacist, doctor, or health caregiver about the best way to dispose of any outdated medicine or medicine no longer needed. · Keep all medicine out of the reach of children. Never share your medicine with anyone. Drugs and Foods to Avoid:   Ask your doctor or pharmacist before using any other medicine, including over-the-counter medicines, vitamins, and herbal products. · Make sure your doctor knows about all other medicines you are using.   Warnings While Using This Medicine:   · Make sure your doctor knows if you are pregnant or breast feeding. · Make sure your doctor knows if you have kidney stones, sarcoidosis, or overactive parathyroid gland. · You should not use this medicine if you are under 25years of age. Possible Side Effects While Using This Medicine:   Call your doctor right away if you notice any of these side effects:  · Allergic reaction: Itching or hives, swelling in your face or hands, swelling or tingling in your mouth or throat, chest tightness, trouble breathing  · Change in how much or how often you urinate. If you notice these less serious side effects, talk with your doctor:   · Diarrhea. · Headache. · Weight loss. If you notice other side effects that you think are caused by this medicine, tell your doctor. Call your doctor for medical advice about side effects. You may report side effects to FDA at 8-229-FDA-5589  © 2017 [address] Information is for End User's use only and may not be sold, redistributed or otherwise used for commercial purposes. The above information is an  only. It is not intended as medical advice for individual conditions or treatments. Talk to your doctor, nurse or pharmacist before following any medical regimen to see if it is safe and effective for you.

## 2021-04-30 ENCOUNTER — OFFICE VISIT (OUTPATIENT)
Dept: FAMILY MEDICINE CLINIC | Age: 36
End: 2021-04-30
Payer: COMMERCIAL

## 2021-04-30 VITALS
TEMPERATURE: 96.8 F | RESPIRATION RATE: 12 BRPM | HEART RATE: 97 BPM | OXYGEN SATURATION: 97 % | HEIGHT: 68 IN | DIASTOLIC BLOOD PRESSURE: 94 MMHG | SYSTOLIC BLOOD PRESSURE: 138 MMHG | WEIGHT: 269 LBS | BODY MASS INDEX: 40.77 KG/M2

## 2021-04-30 DIAGNOSIS — E55.9 VITAMIN D DEFICIENCY: ICD-10-CM

## 2021-04-30 DIAGNOSIS — Z11.59 NEED FOR HEPATITIS C SCREENING TEST: ICD-10-CM

## 2021-04-30 DIAGNOSIS — R06.83 HABITUAL SNORING: ICD-10-CM

## 2021-04-30 DIAGNOSIS — I10 ESSENTIAL HYPERTENSION: Primary | ICD-10-CM

## 2021-04-30 DIAGNOSIS — E78.2 MIXED HYPERLIPIDEMIA: ICD-10-CM

## 2021-04-30 DIAGNOSIS — R73.03 PREDIABETES: ICD-10-CM

## 2021-04-30 PROCEDURE — 99204 OFFICE O/P NEW MOD 45 MIN: CPT | Performed by: STUDENT IN AN ORGANIZED HEALTH CARE EDUCATION/TRAINING PROGRAM

## 2021-04-30 RX ORDER — HYDROCHLOROTHIAZIDE 12.5 MG/1
12.5 TABLET ORAL DAILY
Qty: 90 TAB | Refills: 0 | Status: SHIPPED | OUTPATIENT
Start: 2021-04-30 | End: 2021-06-11

## 2021-04-30 NOTE — PROGRESS NOTES
Chief Complaint: HTN, vit D deficiency and HLD    Subjective  Rigo Cameron is a 39 y.o. male who presents to clinic today for management of blood pressure. Pt was last seen by Dr. Laura Gifford more than 3 years ago. HTN:  Not on any medication. States that he has on and off headache and checked his BP at home and it was significantly elevated. Pt denies any visual change, chest pain on exertion,dyspnea on exertion, swelling of ankles or TIA's. Admits to snoring    Vit D deficiency:  Takes daily vitamins. HLD: Cholesterol high about 3 years ago. Prediabetes:  A1C 6.2% (02/12/2018)    He has no other complains at this time. SHx:   Single with 2 sons. Works at a [employer]. Smoking - 2 cigarettes per day  Alcohol - No  Exercise - Tries walking everyday  Diet - Has been trying to eat healthy    Allergies - reviewed:   No Known Allergies    Medications - reviewed:   Current Outpatient Medications   Medication Sig    hydroCHLOROthiazide (HYDRODIURIL) 12.5 mg tablet Take 1 Tab by mouth daily.  cholecalciferol, VITAMIN D3, (VITAMIN D3) 5,000 unit tab tablet Take 1 Tab by mouth daily. No current facility-administered medications for this visit.         Past Medical History - reviewed:  Past Medical History:   Diagnosis Date    Headache        Social History - reviewed:  Social History     Socioeconomic History    Marital status: SINGLE     Spouse name: Not on file    Number of children: Not on file    Years of education: Not on file    Highest education level: Not on file   Occupational History    Not on file   Social Needs    Financial resource strain: Not on file    Food insecurity     Worry: Not on file     Inability: Not on file    Transportation needs     Medical: Not on file     Non-medical: Not on file   Tobacco Use    Smoking status: Current Some Day Smoker     Packs/day: 0.10     Types: Cigarettes    Smokeless tobacco: Never Used    Tobacco comment: 1-2 daily   Substance and Sexual Activity    Alcohol use: No     Comment: Rarely. Smokes cigar's one a day    Drug use: Yes     Types: Marijuana     Comment: occ    Sexual activity: Yes     Partners: Female   Lifestyle    Physical activity     Days per week: Not on file     Minutes per session: Not on file    Stress: Not on file   Relationships    Social connections     Talks on phone: Not on file     Gets together: Not on file     Attends Jainism service: Not on file     Active member of club or organization: Not on file     Attends meetings of clubs or organizations: Not on file     Relationship status: Not on file    Intimate partner violence     Fear of current or ex partner: Not on file     Emotionally abused: Not on file     Physically abused: Not on file     Forced sexual activity: Not on file   Other Topics Concern    Not on file   Social History Narrative    Not on file       Family History - reviewed:  Family History   Problem Relation Age of Onset    Hypertension Mother     Hypertension Father     No Known Problems Sister     No Known Problems Brother          ROS  CONSTITUTIONAL: Denies: fever, chills, weakness  EYES: Denies: blurry vision, eye pain, photophobia  CARDIOVASCULAR: Denies: chest pain, dyspnea on exertion, palpitations  RESPIRATORY: Denies: cough, shortness of breath      Physical Exam  Visit Vitals  BP (!) 138/94 (BP 1 Location: Right upper arm, BP Patient Position: Sitting, BP Cuff Size: Large adult)   Pulse 97   Temp 96.8 °F (36 °C) (Axillary)   Resp 12   Ht 5' 8\" (1.727 m)   Wt 269 lb (122 kg)   SpO2 97%   BMI 40.90 kg/m²       General: Alert and oriented and in no acute distress. SKIN: No rash. HEAD: Normocephalic, atraumatic, PERRL  EYES: Sclera and conjunctiva clear; pupils round and reactive to light. NECK: Supple; no masses; no lymphadenopathy. LUNGS: Clear to auscultation bilaterally, no wheezes, rales and rhonchi.   CARDIOVASCULAR: Regular rate and rhythm without murmurs, gallops or rubs.  NEUROLOGIC: Speech intact; face symmetrical; moves all extremities equally. Assessment/Plan    ICD-10-CM ICD-9-CM    1. Essential hypertension  A69 943.3 METABOLIC PANEL, COMPREHENSIVE      TSH 3RD GENERATION      hydroCHLOROthiazide (HYDRODIURIL) 12.5 mg tablet   2. Mixed hyperlipidemia  E78.2 272.2 LIPID PANEL   3. Prediabetes  R73.03 790.29 HEMOGLOBIN A1C WITH EAG   4. Vitamin D deficiency  E55.9 268.9 VITAMIN D, 25 HYDROXY   5. Habitual snoring  R06.83 786.09 REFERRAL TO SLEEP STUDIES      CANCELED: REFERRAL TO SLEEP STUDIES   6. Need for hepatitis C screening test  Z11.59 V73.89 HEPATITIS C AB     1. Essential hypertension  Still elevated upon multiple repeats  - METABOLIC PANEL, COMPREHENSIVE; Future  - TSH 3RD GENERATION; Future  - Started on hydroCHLOROthiazide (HYDRODIURIL) 12.5 mg tablet; Take 1 Tab by mouth daily.    - Keep BP log. Can check BP at least 3 times per week. - Notify clinic should BP consistently stays >140/90 or <110/60  - Limit alcohol intake to less than 2 drinks daily   - Encouraged to avoid tobacco products  - Avoid excess caffeine, energy drinks, NSAIDs, decongestants (such as Sudafed, Pseudoephedrine, phenylephrine, and Afrin) and stimulants   - Focus on regular exercise (150 minutes each week) and healthy eating. Eat more fruits, vegetables, protein (egg whites, beans, and nuts you know you tolerate) and less carbohydrates (white bread, white rice, white pasta, white potatoes, sodas, and sweets). Eat appropriately small portion sizes. 2. Mixed hyperlipidemia  - LIPID PANEL; Future    3. Prediabetes  - HEMOGLOBIN A1C WITH EAG; Future    4. Vitamin D deficiency  - VITAMIN D, 25 HYDROXY; Future    5. Habitual snoring  - REFERRAL TO SLEEP STUDIES    6. Need for hepatitis C screening test  - HEPATITIS C AB; Future      Follow up: 6-8 weeks for BP management    I have discussed the diagnosis with the patient and the intended plan as seen in the above orders.  Patient verbalized understanding of the plan and agrees with the plan. The patient has received an after-visit summary and questions were answered concerning future plans. I have discussed medication side effects and warnings with the patient as well. Informed patient to return to the office if worsening or  new symptoms arise.     Signed By: Trini Rodrigues MD     April 30, 2021

## 2021-04-30 NOTE — PATIENT INSTRUCTIONS
YOUR BLOOD PRESSURE IS HIGH    - TAKE ONE TABLET OF HCTZ 12.5 MG DAILY  - CHECK YOUR BLOOD PRESSURE AT HOME. Can check BP at least 3 times per week. - Notify clinic should BP consistently stays >140/90 or <110/60  - Limit alcohol intake to less than 2 drinks daily   - Encouraged to avoid tobacco products  - Avoid excess caffeine, energy drinks, NSAIDs, decongestants (such as Sudafed, Pseudoephedrine, phenylephrine, and Afrin) and stimulants   - Focus on regular exercise (150 minutes each week) and healthy eating. Eat more fruits, vegetables, protein (egg whites, beans, and nuts you know you tolerate) and less carbohydrates (white bread, white rice, white pasta, white potatoes, sodas, and sweets). Eat appropriately small portion sizes.

## 2021-04-30 NOTE — PROGRESS NOTES
Chief Complaint   Patient presents with    Establish Care    Vitamin D Deficiency       1. Have you been to the ER, urgent care clinic since your last visit? Hospitalized since your last visit? No    2. Have you seen or consulted any other health care providers outside of the [address] [place] [address] since your last visit? Include any pap smears or colon screening.  No    Health Maintenance Due   Topic Date Due    Hepatitis C Screening  Never done    Pneumococcal 0-64 years (1 of 1 - PPSV23) Never done    COVID-19 Vaccine (1) Never done

## 2021-05-01 LAB
25(OH)D3 SERPL-MCNC: 14.4 NG/ML (ref 30–100)
ALBUMIN SERPL-MCNC: 4 G/DL (ref 3.5–5)
ALBUMIN/GLOB SERPL: 1.1 {RATIO} (ref 1.1–2.2)
ALP SERPL-CCNC: 86 U/L (ref 45–117)
ALT SERPL-CCNC: 45 U/L (ref 12–78)
ANION GAP SERPL CALC-SCNC: 5 MMOL/L (ref 5–15)
AST SERPL-CCNC: 20 U/L (ref 15–37)
BILIRUB SERPL-MCNC: 0.3 MG/DL (ref 0.2–1)
BUN SERPL-MCNC: 11 MG/DL (ref 6–20)
BUN/CREAT SERPL: 12 (ref 12–20)
CALCIUM SERPL-MCNC: 9.1 MG/DL (ref 8.5–10.1)
CHLORIDE SERPL-SCNC: 109 MMOL/L (ref 97–108)
CHOLEST SERPL-MCNC: 216 MG/DL
CO2 SERPL-SCNC: 26 MMOL/L (ref 21–32)
CREAT SERPL-MCNC: 0.92 MG/DL (ref 0.7–1.3)
EST. AVERAGE GLUCOSE BLD GHB EST-MCNC: 146 MG/DL
GLOBULIN SER CALC-MCNC: 3.5 G/DL (ref 2–4)
GLUCOSE SERPL-MCNC: 96 MG/DL (ref 65–100)
HBA1C MFR BLD: 6.7 % (ref 4–5.6)
HCV AB SERPL QL IA: NONREACTIVE
HCV COMMENT,HCGAC: NORMAL
HDLC SERPL-MCNC: 45 MG/DL
HDLC SERPL: 4.8 {RATIO} (ref 0–5)
LDLC SERPL CALC-MCNC: 154.2 MG/DL (ref 0–100)
LIPID PROFILE,FLP: ABNORMAL
POTASSIUM SERPL-SCNC: 4.2 MMOL/L (ref 3.5–5.1)
PROT SERPL-MCNC: 7.5 G/DL (ref 6.4–8.2)
SODIUM SERPL-SCNC: 140 MMOL/L (ref 136–145)
TRIGL SERPL-MCNC: 84 MG/DL (ref ?–150)
TSH SERPL DL<=0.05 MIU/L-ACNC: 1.61 UIU/ML (ref 0.36–3.74)
VLDLC SERPL CALC-MCNC: 16.8 MG/DL

## 2021-05-04 DIAGNOSIS — E11.9 CONTROLLED TYPE 2 DIABETES MELLITUS WITHOUT COMPLICATION, WITHOUT LONG-TERM CURRENT USE OF INSULIN (HCC): Primary | ICD-10-CM

## 2021-05-04 RX ORDER — METFORMIN HYDROCHLORIDE 500 MG/1
500 TABLET, EXTENDED RELEASE ORAL 2 TIMES DAILY WITH MEALS
Qty: 180 TAB | Refills: 0 | Status: SHIPPED | OUTPATIENT
Start: 2021-05-04 | End: 2021-06-11 | Stop reason: SDUPTHER

## 2021-05-04 NOTE — PROGRESS NOTES
Reviewed. A1C 6.7  Vit D deficiency  High LDL  Other results look ok. Called and left voicemail.  Sending mychart message and Metformin to pharmacy

## 2021-06-11 ENCOUNTER — OFFICE VISIT (OUTPATIENT)
Dept: FAMILY MEDICINE CLINIC | Age: 36
End: 2021-06-11
Payer: COMMERCIAL

## 2021-06-11 VITALS
OXYGEN SATURATION: 99 % | HEART RATE: 87 BPM | BODY MASS INDEX: 39.92 KG/M2 | DIASTOLIC BLOOD PRESSURE: 88 MMHG | SYSTOLIC BLOOD PRESSURE: 124 MMHG | HEIGHT: 68 IN | RESPIRATION RATE: 16 BRPM | WEIGHT: 263.4 LBS | TEMPERATURE: 98.1 F

## 2021-06-11 DIAGNOSIS — E11.9 TYPE 2 DIABETES MELLITUS WITHOUT COMPLICATION, WITHOUT LONG-TERM CURRENT USE OF INSULIN (HCC): ICD-10-CM

## 2021-06-11 DIAGNOSIS — I10 ESSENTIAL HYPERTENSION: Primary | ICD-10-CM

## 2021-06-11 PROCEDURE — 99214 OFFICE O/P EST MOD 30 MIN: CPT | Performed by: STUDENT IN AN ORGANIZED HEALTH CARE EDUCATION/TRAINING PROGRAM

## 2021-06-11 RX ORDER — HYDROCHLOROTHIAZIDE 12.5 MG/1
12.5 TABLET ORAL DAILY
Qty: 90 TABLET | Refills: 0 | Status: CANCELLED | OUTPATIENT
Start: 2021-06-11

## 2021-06-11 RX ORDER — LISINOPRIL AND HYDROCHLOROTHIAZIDE 12.5; 2 MG/1; MG/1
1 TABLET ORAL DAILY
Qty: 90 TABLET | Refills: 0 | Status: SHIPPED | OUTPATIENT
Start: 2021-06-11 | End: 2021-08-13 | Stop reason: SDUPTHER

## 2021-06-11 RX ORDER — METFORMIN HYDROCHLORIDE 500 MG/1
500 TABLET, EXTENDED RELEASE ORAL 2 TIMES DAILY WITH MEALS
Qty: 180 TABLET | Refills: 0 | Status: SHIPPED | OUTPATIENT
Start: 2021-06-11 | End: 2021-08-13 | Stop reason: SDUPTHER

## 2021-06-11 NOTE — PROGRESS NOTES
Chief Complaint: HTN and diabetes    Subjective  Anya Mancilla is a 39 y.o. male who presents to clinic today for management of;    HTN:  Currently taking HCTZ 12.5 mg daily. BP still high at home. Compliant with no side effects    Pt denies any visual change, chest pain on exertion,dyspnea on exertion, swelling of ankles or TIA's. Admits to snoring    Diabetes: Newly diagnosed (5/2021)    Pt has adopted some lifestyle changes since our last visit; exercise frequently, eats better and avoid junk foods. No polyuria, polydipsia, or visual problems. SHx:   Single with 2 sons. Works at a [employer]. Smoking - 2 cigarettes per day  Alcohol - No    Allergies - reviewed:   No Known Allergies    Medications - reviewed:   Current Outpatient Medications   Medication Sig    metFORMIN ER (GLUCOPHAGE XR) 500 mg tablet Take 1 Tablet by mouth two (2) times daily (with meals).  lisinopril-hydroCHLOROthiazide (PRINZIDE, ZESTORETIC) 20-12.5 mg per tablet Take 1 Tablet by mouth daily.  cholecalciferol, VITAMIN D3, (VITAMIN D3) 5,000 unit tab tablet Take 1 Tab by mouth daily. No current facility-administered medications for this visit. Past Medical History - reviewed:  Past Medical History:   Diagnosis Date    Headache        Social History - reviewed:  Social History     Socioeconomic History    Marital status: SINGLE     Spouse name: Not on file    Number of children: Not on file    Years of education: Not on file    Highest education level: Not on file   Occupational History    Not on file   Tobacco Use    Smoking status: Current Some Day Smoker     Packs/day: 0.10     Types: Cigarettes    Smokeless tobacco: Never Used    Tobacco comment: 1-2 daily   Vaping Use    Vaping Use: Never used   Substance and Sexual Activity    Alcohol use: No     Comment: Rarely.    Smokes cigar's one a day    Drug use: Yes     Types: Marijuana     Comment: occ    Sexual activity: Yes     Partners: Female   Other Topics Concern    Not on file   Social History Narrative    Not on file     Social Determinants of Health     Financial Resource Strain:     Difficulty of Paying Living Expenses:    Food Insecurity:     Worried About Running Out of Food in the Last Year:     [address] in the Last Year:    Transportation Needs:     Lack of Transportation (Medical):  Lack of Transportation (Non-Medical):    Physical Activity:     Days of Exercise per Week:     Minutes of Exercise per Session:    Stress:     Feeling of Stress :    Social Connections:     Frequency of Communication with Friends and Family:     Frequency of Social Gatherings with Friends and Family:     Attends Scientology Services:     Active Member of Clubs or Organizations:     Attends Club or Organization Meetings:     Marital Status:    Intimate Partner Violence:     Fear of Current or Ex-Partner:     Emotionally Abused:     Physically Abused:     Sexually Abused:        Family History - reviewed:  Family History   Problem Relation Age of Onset    Hypertension Mother     Hypertension Father     No Known Problems Sister     No Known Problems Brother          ROS  CONSTITUTIONAL: Denies: fever, chills, weakness  EYES: Denies: blurry vision, eye pain, photophobia  CARDIOVASCULAR: Denies: chest pain, dyspnea on exertion, palpitations  RESPIRATORY: Denies: cough, shortness of breath      Physical Exam  Visit Vitals  /88 (BP 1 Location: Right upper arm, BP Patient Position: Sitting, BP Cuff Size: Large adult)   Pulse 87   Temp 98.1 °F (36.7 °C) (Oral)   Resp 16   Ht 5' 8\" (1.727 m)   Wt 263 lb 6.4 oz (119.5 kg)   SpO2 99%   BMI 40.05 kg/m²       General: Alert and oriented and in no acute distress. SKIN: No rash. HEAD: Normocephalic, atraumatic, PERRL  EYES: Sclera and conjunctiva clear; pupils round and reactive to light. NECK: Supple; no masses; no lymphadenopathy.           LUNGS: Clear to auscultation bilaterally, no wheezes, rales and rhonchi. CARDIOVASCULAR: Regular rate and rhythm without murmurs, gallops or rubs. NEUROLOGIC: Speech intact; face symmetrical; moves all extremities equally. Assessment/Plan    ICD-10-CM ICD-9-CM    1. Essential hypertension  I10 401.9 lisinopril-hydroCHLOROthiazide (PRINZIDE, ZESTORETIC) 20-12.5 mg per tablet   2. Type 2 diabetes mellitus without complication, without long-term current use of insulin (HCC)  E11.9 250.00 metFORMIN ER (GLUCOPHAGE XR) 500 mg tablet      REFERRAL TO OPHTHALMOLOGY     1. Essential hypertension  Home BP still slightly elevated per patient but ok here upon repeat. - Given new diagnosis, will add ACE-I.  - START lisinopril-hydroCHLOROthiazide (PRINZIDE, ZESTORETIC) 20-12.5 mg per tablet; Take 1 Tablet by mouth daily.    - Keep BP log. Can check BP at least 3 times per week. - Notify clinic should BP consistently stays >140/90 or <110/60  - Limit alcohol intake to less than 2 drinks daily   - Encouraged to avoid tobacco products  - Avoid excess caffeine, energy drinks, NSAIDs, decongestants (such as Sudafed, Pseudoephedrine, phenylephrine, and Afrin) and stimulants   - Focus on regular exercise (150 minutes each week) and healthy eating. Eat more fruits, vegetables, protein (egg whites, beans, and nuts you know you tolerate) and less carbohydrates (white bread, white rice, white pasta, white potatoes, sodas, and sweets). Eat appropriately small portion sizes. 2. Type 2 diabetes mellitus without complication, without long-term current use of insulin [facility]): Reviewed previous results; Lab Results   Component Value Date/Time    Hemoglobin A1c 6.7 (H) 04/30/2021 04:23 PM     - START metFORMIN ER (GLUCOPHAGE XR) 500 mg tablet; Take 1 Tablet by mouth two (2) times daily (with meals). Dispense: 180 Tablet; Refill: 0  - REFERRAL TO OPHTHALMOLOGY  - Continue home monitoring. Glucose goals;  Fasting (), preprandial (<140), 2-hr post-prandial (<180)    Diabetic issues reviewed : glycemic goals , written exchange diet given, low carbohydrate diet (avoid white bread, white rice, white pasta, white potatoes, sodas, and sweets) , weight control (exercise at least 30 mins daily x 5 days), home glucose monitoring emphasized,  hypoglycemia management and long term diabetic complications discussed. Flu shot annually ,Pneumovax (once before 72years old and then again after your are 72years old),aspirin daily,ACE-I/ARB and statin if indicated, annual eye exam and microalbumin. Follow up: 3 months    I have discussed the diagnosis with the patient and the intended plan as seen in the above orders. Patient verbalized understanding of the plan and agrees with the plan. The patient has received an after-visit summary and questions were answered concerning future plans. I have discussed medication side effects and warnings with the patient as well. Informed patient to return to the office if worsening or  new symptoms arise.     Signed By: Antonella Esparza MD     June 11, 2021

## 2021-06-11 NOTE — PATIENT INSTRUCTIONS
OPTHALMOLOGY:  
 
-[facility] - [phone] -[facility] - [phone] 
-[facility] - [phone] ([address], [place], [facility] [address]) -[facility] - [phone] PLEASE ASK FOR DILATED EYE EXAM AND RESULTS FAXED TO ME

## 2021-06-11 NOTE — PROGRESS NOTES
Name and  Verified. Pharmacy verified    Chief Complaint   Patient presents with    Follow-up     6-8weeks/ Hypertension     Patient was unaware that he had Diabetes and did not see [employer] message about metformin prescription at pharmacy. Has not taken medication. 1. Have you been to the ER, urgent care clinic since your last visit? Hospitalized since your last visit? No    2. Have you seen or consulted any other health care providers outside of the [address] [place] [address] since your last visit? Include any pap smears or colon screening.  No      Health Maintenance Due   Topic Date Due    Pneumococcal 0-64 years (1 of 2 - PPSV23) Never done    Foot Exam Q1  Never done    MICROALBUMIN Q1  Never done    Eye Exam Retinal or Dilated  Never done    COVID-19 Vaccine (1) Never done

## 2021-08-13 ENCOUNTER — OFFICE VISIT (OUTPATIENT)
Dept: FAMILY MEDICINE CLINIC | Age: 36
End: 2021-08-13
Payer: COMMERCIAL

## 2021-08-13 VITALS
WEIGHT: 254.8 LBS | HEIGHT: 68 IN | TEMPERATURE: 97.6 F | RESPIRATION RATE: 18 BRPM | SYSTOLIC BLOOD PRESSURE: 130 MMHG | DIASTOLIC BLOOD PRESSURE: 86 MMHG | OXYGEN SATURATION: 98 % | BODY MASS INDEX: 38.62 KG/M2 | HEART RATE: 85 BPM

## 2021-08-13 DIAGNOSIS — E78.2 MIXED HYPERLIPIDEMIA: ICD-10-CM

## 2021-08-13 DIAGNOSIS — E11.9 TYPE 2 DIABETES MELLITUS WITHOUT COMPLICATION, WITHOUT LONG-TERM CURRENT USE OF INSULIN (HCC): ICD-10-CM

## 2021-08-13 DIAGNOSIS — I10 ESSENTIAL HYPERTENSION: Primary | ICD-10-CM

## 2021-08-13 PROCEDURE — 99214 OFFICE O/P EST MOD 30 MIN: CPT | Performed by: STUDENT IN AN ORGANIZED HEALTH CARE EDUCATION/TRAINING PROGRAM

## 2021-08-13 RX ORDER — ATORVASTATIN CALCIUM 40 MG/1
40 TABLET, FILM COATED ORAL DAILY
Qty: 90 TABLET | Refills: 2 | Status: SHIPPED | OUTPATIENT
Start: 2021-08-13 | End: 2022-02-18 | Stop reason: SDDI

## 2021-08-13 RX ORDER — METFORMIN HYDROCHLORIDE 500 MG/1
500 TABLET, EXTENDED RELEASE ORAL 2 TIMES DAILY WITH MEALS
Qty: 180 TABLET | Refills: 3 | Status: SHIPPED | OUTPATIENT
Start: 2021-08-13 | End: 2022-02-18 | Stop reason: SDDI

## 2021-08-13 RX ORDER — LISINOPRIL AND HYDROCHLOROTHIAZIDE 12.5; 2 MG/1; MG/1
1 TABLET ORAL DAILY
Qty: 90 TABLET | Refills: 3 | Status: SHIPPED | OUTPATIENT
Start: 2021-08-13 | End: 2022-02-18 | Stop reason: SDDI

## 2021-08-13 RX ORDER — METFORMIN HYDROCHLORIDE 1000 MG/1
1000 TABLET ORAL 2 TIMES DAILY WITH MEALS
Qty: 180 TABLET | Refills: 3 | Status: SHIPPED | OUTPATIENT
Start: 2021-08-13 | End: 2021-08-13 | Stop reason: DRUGHIGH

## 2021-08-13 RX ORDER — METFORMIN HYDROCHLORIDE 500 MG/1
500 TABLET, EXTENDED RELEASE ORAL 2 TIMES DAILY WITH MEALS
Qty: 180 TABLET | Refills: 3 | Status: SHIPPED | OUTPATIENT
Start: 2021-08-13 | End: 2021-08-13 | Stop reason: DRUGHIGH

## 2021-08-13 NOTE — PROGRESS NOTES
Is This A New Presentation, Or A Follow-Up?: Skin Lesion Name and  Verified. Pharmacy verified    Chief Complaint   Patient presents with    Follow-up     3-4 Months/ Hypertension       Patient asked about eye exam, will schedule an appt. With Opthalmology. 1. Have you been to the ER, urgent care clinic since your last visit? Hospitalized since your last visit? No    2. Have you seen or consulted any other health care providers outside of the [address] [place] [address] since your last visit? Include any pap smears or colon screening.  No      Health Maintenance Due   Topic Date Due    Pneumococcal 0-64 years (1 of 2 - PPSV23) Never done    Foot Exam Q1  Never done    MICROALBUMIN Q1  Never done    Eye Exam Retinal or Dilated  Never done    COVID-19 Vaccine (1) Never done

## 2021-08-13 NOTE — PROGRESS NOTES
[address] [address] [address]. Martine, [facility] [address] [place] [address]  [phone]    Chief Complaint: HTN and diabetes    Subjective  Estephanie Ellis is a 39 y.o. male who presents to clinic today for management of;    HTN:  Currently taking Prizide mg daily. BP still high at home. Compliant with no side effects    Pt denies any visual change, chest pain on exertion,dyspnea on exertion, swelling of ankles or TIA's. Admits to snoring    Diabetes: Newly diagnosed (5/2021)  On Metformin 500mg BID  Does not check sugar at home. Pt has adopted some lifestyle changes since our last visit; exercise frequently, eats better and avoid junk foods. No polyuria, polydipsia, or visual problems. SHx:   Single with 2 sons. Works at a [employer]. Smoking - 2 cigarettes per day  Alcohol - No    Allergies - reviewed:   No Known Allergies    Medications - reviewed:   Current Outpatient Medications   Medication Sig    lisinopril-hydroCHLOROthiazide (PRINZIDE, ZESTORETIC) 20-12.5 mg per tablet Take 1 Tablet by mouth daily.  atorvastatin (LIPITOR) 40 mg tablet Take 1 Tablet by mouth daily.  metFORMIN ER (GLUCOPHAGE XR) 500 mg tablet Take 1 Tablet by mouth two (2) times daily (with meals).  cholecalciferol, VITAMIN D3, (VITAMIN D3) 5,000 unit tab tablet Take 1 Tab by mouth daily. No current facility-administered medications for this visit.        Past Medical History - reviewed:  Past Medical History:   Diagnosis Date    Headache        Social History - reviewed:  Social History     Socioeconomic History    Marital status: SINGLE     Spouse name: Not on file    Number of children: Not on file    Years of education: Not on file    Highest education level: Not on file   Occupational History    Not on file   Tobacco Use    Smoking status: Current Some Day Smoker     Packs/day: 0.10     Types: Cigarettes    Smokeless tobacco: Never Used    Tobacco comment: 1-2 daily   Vaping Use  Vaping Use: Never used   Substance and Sexual Activity    Alcohol use: No     Comment: Rarely. Smokes cigar's one a day    Drug use: Yes     Types: Marijuana     Comment: occ    Sexual activity: Yes     Partners: Female   Other Topics Concern    Not on file   Social History Narrative    Not on file     Social Determinants of Health     Financial Resource Strain:     Difficulty of Paying Living Expenses:    Food Insecurity:     Worried About Running Out of Food in the Last Year:     [address] in the Last Year:    Transportation Needs:     Lack of Transportation (Medical):  Lack of Transportation (Non-Medical):    Physical Activity:     Days of Exercise per Week:     Minutes of Exercise per Session:    Stress:     Feeling of Stress :    Social Connections:     Frequency of Communication with Friends and Family:     Frequency of Social Gatherings with Friends and Family:     Attends Scientology Services:     Active Member of Clubs or Organizations:     Attends Club or Organization Meetings:     Marital Status:    Intimate Partner Violence:     Fear of Current or Ex-Partner:     Emotionally Abused:     Physically Abused:     Sexually Abused:        Family History - reviewed:  Family History   Problem Relation Age of Onset    Hypertension Mother     Hypertension Father     No Known Problems Sister     No Known Problems Brother          ROS  CONSTITUTIONAL: Denies: fever, chills, weakness  EYES: Denies: blurry vision, eye pain, photophobia  CARDIOVASCULAR: Denies: chest pain, dyspnea on exertion, palpitations  RESPIRATORY: Denies: cough, shortness of breath      Physical Exam  Visit Vitals  /86 (BP 1 Location: Left upper arm, BP Patient Position: Sitting, BP Cuff Size: Large adult)   Pulse 85   Temp 97.6 °F (36.4 °C) (Oral)   Resp 18   Ht 5' 8\" (1.727 m)   Wt 254 lb 12.8 oz (115.6 kg)   SpO2 98%   BMI 38.74 kg/m²       General: Alert and oriented and in no acute distress. SKIN: No rash. HEAD: Normocephalic, atraumatic, PERRL  EYES: Sclera and conjunctiva clear; pupils round and reactive to light. NECK: Supple; no masses; no lymphadenopathy. LUNGS: Clear to auscultation bilaterally, no wheezes, rales and rhonchi. CARDIOVASCULAR: Regular rate and rhythm without murmurs, gallops or rubs. NEUROLOGIC: Speech intact; face symmetrical; moves all extremities equally. Assessment/Plan    ICD-10-CM ICD-9-CM    1. Essential hypertension  I10 401.9 lisinopril-hydroCHLOROthiazide (PRINZIDE, ZESTORETIC) 20-12.5 mg per tablet   2. Type 2 diabetes mellitus without complication, without long-term current use of insulin (HCC)  E11.9 250.00  DIABETES FOOT EXAM      MICROALBUMIN, UR, RAND W/ MICROALB/CREAT RATIO      REFERRAL TO OPHTHALMOLOGY      HEMOGLOBIN A1C WITH EAG      metFORMIN ER (GLUCOPHAGE XR) 500 mg tablet      DISCONTINUED: metFORMIN ER (GLUCOPHAGE XR) 500 mg tablet      DISCONTINUED: metFORMIN (GLUCOPHAGE) 1,000 mg tablet   3. Mixed hyperlipidemia  E78.2 272.2 atorvastatin (LIPITOR) 40 mg tablet     1. Essential hypertension  Home BP still slightly elevated per patient but ok here upon repeat. - Continue- lisinopril-hydroCHLOROthiazide (PRINZIDE, ZESTORETIC) 20-12.5 mg per tablet; Take 1 Tablet by mouth daily.  - Keep BP log. Can check BP at least 3 times per week. - Notify clinic should BP consistently stays >140/90 or <110/60  - Limit alcohol intake to less than 2 drinks daily   - Encouraged to avoid tobacco products  - Avoid excess caffeine, energy drinks, NSAIDs, decongestants (such as Sudafed, Pseudoephedrine, phenylephrine, and Afrin) and stimulants   - Focus on regular exercise (150 minutes each week) and healthy eating. Eat more fruits, vegetables, protein (egg whites, beans, and nuts you know you tolerate) and less carbohydrates (white bread, white rice, white pasta, white potatoes, sodas, and sweets). Eat appropriately small portion sizes.       2. Type 2 diabetes mellitus without complication, without long-term current use of insulin [facility]): Reviewed previous results; Lab Results   Component Value Date/Time    Hemoglobin A1c 6.7 (H) 04/30/2021 04:23 PM     -  DIABETES FOOT EXAM  - MICROALBUMIN, UR, RAND W/ MICROALB/CREAT RATIO; Future  - REFERRAL TO OPHTHALMOLOGY  - HEMOGLOBIN A1C WITH EAG; Future  - metFORMIN ER (GLUCOPHAGE XR) 500 mg tablet; Take 1 Tablet by mouth two (2) times daily (with meals). - Continue home monitoring. Glucose goals; Fasting (), preprandial (<140), 2-hr post-prandial (<180)    Diabetic issues reviewed : glycemic goals , written exchange diet given, low carbohydrate diet (avoid white bread, white rice, white pasta, white potatoes, sodas, and sweets) , weight control (exercise at least 30 mins daily x 5 days), home glucose monitoring emphasized,  hypoglycemia management and long term diabetic complications discussed. Flu shot annually ,Pneumovax (once before 72years old and then again after your are 72years old),aspirin daily,ACE-I/ARB and statin if indicated, annual eye exam and microalbumin. 3. Mixed hyperlipidemia  - atorvastatin (LIPITOR) 40 mg tablet; Take 1 Tablet by mouth daily.    - I counseled on lifestyle changes; discussed of the importance of eating habits/patterns and physical activity to overall health. Also discussed the importance to avoid smoking and excessive alcohol consumption.  - Encouraged to eat foods that are baked, broiled, boiled, steamed or grilled. Avoid greasy or fried foods. Use olive oil or canola oil instead of vegetable oil. Eat fish twice weekly. Decreasing weight by 5-10% of baseline weight over the next 6 months if overweight/obese helps to improve obesity-related conditions. Eat a low carbohydrate diet. Decrease sugary beverages, white foods and sweets. Increase exercise to 5 days weekly for 30 minutes daily minimally and as tolerated.   Have at an average of 7 hours of uninterrupted sleep at night. Follow up: 6 months    I have discussed the diagnosis with the patient and the intended plan as seen in the above orders. Patient verbalized understanding of the plan and agrees with the plan. The patient has received an after-visit summary and questions were answered concerning future plans. I have discussed medication side effects and warnings with the patient as well. Informed patient to return to the office if worsening or  new symptoms arise.     Signed By: Trista Palmer MD     August 13, 2021

## 2021-08-13 NOTE — PATIENT INSTRUCTIONS
OPTHALMOLOGY/OPTOMETRY:   For routine eye exams or diabetic exams, mail out reference sheet to patient, only schedule appointments for eye problems. Dr. Carlo Metcalf;    [address] [place] [address] [address] [facility]  Phone: [phone] -[address] [facility] [phone]  [address] [address] [phone]  -[facility] - [phone] ([address], [place], [address] [place] [address])  [address] [facility] - [phone]

## 2021-12-01 ENCOUNTER — NURSE TRIAGE (OUTPATIENT)
Dept: OTHER | Facility: CLINIC | Age: 36
End: 2021-12-01

## 2021-12-01 NOTE — TELEPHONE ENCOUNTER
Reason for Disposition   MILD difficulty breathing (e.g., minimal/no SOB at rest, SOB with walking, pulse < 100) of new onset or worse than normal    Answer Assessment - Initial Assessment Questions  1. RESPIRATORY STATUS: \"Describe your breathing? \" (e.g., wheezing, shortness of breath, unable to speak, severe coughing)          SOB, heavier than normal, faster - pt noticed while reviewing a call with his supervisor. 2. ONSET: \"When did this breathing problem begin? \"           Monday was when he really started paying attention to it, has been having increased stress and HTN readings with increased frequency of HA's in the past month. 3. PATTERN \"Does the difficult breathing come and go, or has it been constant since it started? \"            Intermittent     4. SEVERITY: \"How bad is your breathing? \" (e.g., mild, moderate, severe)     - MILD: No SOB at rest, mild SOB with walking, speaks normally in sentences, can lay down, no retractions, pulse < 100.     - MODERATE: SOB at rest, SOB with minimal exertion and prefers to sit, cannot lie down flat, speaks in phrases, mild retractions, audible wheezing, pulse 100-120.     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 RECURRENT SYMPTOM: \"Have you had difficulty breathing before? \" If so, ask: \"When was the last time? \" and \"What happened that time? \"             Has had it in past with weight but not recently and has changed diet and exercise routine to improve things     6. CARDIAC HISTORY: \"Do you have any history of heart disease? \" (e.g., heart attack, angina, bypass surgery, angioplasty)            Denies     7. LUNG HISTORY: \"Do you have any history of lung disease? \"  (e.g., pulmonary embolus, asthma, emphysema)            Denies     8.  CAUSE: \"What do you think is causing the breathing problem? \"        Pt has more stress recently and believes it is contributing to his symptoms     9. OTHER SYMPTOMS: \"Do you have any other symptoms? (e.g., dizziness, runny nose, cough, chest pain, fever)          Monday pt had a HA and took BP and it was high 150/92. Does not have machine to take it currently and HA have been happening more in the last month, denies CP, denies SOB currently      10. PREGNANCY: \"Is there any chance you are pregnant? \" \"When was your last menstrual period? \"        NA     11. TRAVEL: \"Have you traveled out of the country in the last month? \" (e.g., travel history, exposures)             Denies    Protocols used: BREATHING DIFFICULTY-ADULT-OH    Received call from Henriette Ormond at [facility] with Red Flag Complaint. Brief description of triage: see above     Triage indicates for patient to  Be seen NOW for new onset SOB, HTN, possible tachy with increased stress at work. Pt advised to go to [address] [place] [address] if no appts available. Care advice provided, patient verbalizes understanding; denies any other questions or concerns; instructed to call back for any new or worsening symptoms. Writer provided warm transfer to Ignacio Spine  at [facility] for appointment scheduling. Attention Provider: Thank you for allowing me to participate in the care of your patient. The patient was connected to triage in response to information provided to the [facility]. Please do not respond through this encounter as the response is not directed to a shared pool.

## 2021-12-02 ENCOUNTER — OFFICE VISIT (OUTPATIENT)
Dept: FAMILY MEDICINE CLINIC | Age: 36
End: 2021-12-02
Payer: COMMERCIAL

## 2021-12-02 VITALS
RESPIRATION RATE: 12 BRPM | HEART RATE: 78 BPM | DIASTOLIC BLOOD PRESSURE: 93 MMHG | TEMPERATURE: 98.5 F | SYSTOLIC BLOOD PRESSURE: 135 MMHG | BODY MASS INDEX: 38.4 KG/M2 | OXYGEN SATURATION: 97 % | WEIGHT: 253.4 LBS | HEIGHT: 68 IN

## 2021-12-02 DIAGNOSIS — E11.9 TYPE 2 DIABETES MELLITUS WITHOUT COMPLICATION, WITHOUT LONG-TERM CURRENT USE OF INSULIN (HCC): ICD-10-CM

## 2021-12-02 DIAGNOSIS — Z56.6 STRESS AT WORK: Primary | ICD-10-CM

## 2021-12-02 DIAGNOSIS — I10 PRIMARY HYPERTENSION: ICD-10-CM

## 2021-12-02 LAB
CREAT UR-MCNC: 179 MG/DL
EST. AVERAGE GLUCOSE BLD GHB EST-MCNC: 126 MG/DL
HBA1C MFR BLD: 6 % (ref 4–5.6)
MICROALBUMIN UR-MCNC: 0.8 MG/DL
MICROALBUMIN/CREAT UR-RTO: 4 MG/G (ref 0–30)

## 2021-12-02 PROCEDURE — 99213 OFFICE O/P EST LOW 20 MIN: CPT | Performed by: STUDENT IN AN ORGANIZED HEALTH CARE EDUCATION/TRAINING PROGRAM

## 2021-12-02 SDOH — HEALTH STABILITY - MENTAL HEALTH: OTHER PHYSICAL AND MENTAL STRAIN RELATED TO WORK: Z56.6

## 2021-12-02 NOTE — PROGRESS NOTES
[address]  [address] [facility], [address]  [phone]    Chief Complaint: HTN, Diabetes and Stress    Subjective  Windy Saab is a 39 y.o. male who presents to clinic today for management of;    Stress:  Requesting for short term disability due to work related stress. Works at [employer]. Pt states that he is not depressed. States that he is just overwhelm by the workload and demands to meet certain metrics. Was advised to consider taking a leave of absence by his supervisor. Endorse constant headache especially while at work. HTN:  Non compliant. Stopped taking all his medications in 09/2021  States that he changed his lifestyle. BP intermittently high at home. Pt has on and off headaches which he attributes to work related stress. Pt denies any visual change, chest pain on exertion,dyspnea on exertion, swelling of ankles or TIA's. Admits to snoring. Diabetes: Newly diagnosed (5/2021)  Non compliant. Pt stopped taking all his medications in 09/2021  Does not check sugar at home. No polyuria, polydipsia, or visual problems. SHx:   Single with 2 sons. Works at a [employer]. Smoking - 2 cigarettes per day  Alcohol - No    Allergies - reviewed:   No Known Allergies    Medications - reviewed:   Current Outpatient Medications   Medication Sig    cholecalciferol, VITAMIN D3, (VITAMIN D3) 5,000 unit tab tablet Take 1 Tab by mouth daily.  lisinopril-hydroCHLOROthiazide (PRINZIDE, ZESTORETIC) 20-12.5 mg per tablet Take 1 Tablet by mouth daily. (Patient not taking: Reported on 12/2/2021)    atorvastatin (LIPITOR) 40 mg tablet Take 1 Tablet by mouth daily. (Patient not taking: Reported on 12/2/2021)    metFORMIN ER (GLUCOPHAGE XR) 500 mg tablet Take 1 Tablet by mouth two (2) times daily (with meals). (Patient not taking: Reported on 12/2/2021)     No current facility-administered medications for this visit. Past Medical History - reviewed:  Past Medical History:   Diagnosis Date    Headache        Social History - reviewed:  Social History     Socioeconomic History    Marital status: SINGLE     Spouse name: Not on file    Number of children: Not on file    Years of education: Not on file    Highest education level: Not on file   Occupational History    Not on file   Tobacco Use    Smoking status: Current Some Day Smoker     Packs/day: 0.10     Types: Cigarettes    Smokeless tobacco: Never Used    Tobacco comment: 1-2 daily   Vaping Use    Vaping Use: Never used   Substance and Sexual Activity    Alcohol use: No     Comment: Rarely. Smokes cigar's one a day    Drug use: Yes     Types: Marijuana     Comment: occ    Sexual activity: Yes     Partners: Female   Other Topics Concern    Not on file   Social History Narrative    Not on file     Social Determinants of Health     Financial Resource Strain:     Difficulty of Paying Living Expenses: Not on file   Food Insecurity:     Worried About Running Out of Food in the Last Year: Not on file    Chante of Food in the Last Year: Not on file   Transportation Needs:     Lack of Transportation (Medical): Not on file    Lack of Transportation (Non-Medical):  Not on file   Physical Activity:     Days of Exercise per Week: Not on file    Minutes of Exercise per Session: Not on file   Stress:     Feeling of Stress : Not on file   Social Connections:     Frequency of Communication with Friends and Family: Not on file    Frequency of Social Gatherings with Friends and Family: Not on file    Attends Orthodoxy Services: Not on file    Active Member of Clubs or Organizations: Not on file    Attends Club or Organization Meetings: Not on file    Marital Status: Not on file   Intimate Partner Violence:     Fear of Current or Ex-Partner: Not on file    Emotionally Abused: Not on file    Physically Abused: Not on file    Sexually Abused: Not on file Housing Stability:     Unable to Pay for Housing in the Last Year: Not on file    Number of Places Lived in the Last Year: Not on file    Unstable Housing in the Last Year: Not on file       Family History - reviewed:  Family History   Problem Relation Age of Onset    Hypertension Mother     Hypertension Father     No Known Problems Sister     No Known Problems Brother          ROS  CONSTITUTIONAL: Denies: fever, chills, weakness  EYES: Denies: blurry vision, eye pain, photophobia  CARDIOVASCULAR: Denies: chest pain, dyspnea on exertion, palpitations  RESPIRATORY: Denies: cough, shortness of breath      Physical Exam  Visit Vitals  BP (!) 135/93 (BP 1 Location: Right arm, BP Patient Position: Sitting, BP Cuff Size: Adult)   Pulse 78   Temp 98.5 °F (36.9 °C) (Oral)   Resp 12   Ht 5' 8\" (1.727 m)   Wt 253 lb 6.4 oz (114.9 kg)   SpO2 97%   BMI 38.53 kg/m²       General: Alert and oriented and in no acute distress. SKIN: No rash. HEAD: Normocephalic, atraumatic, PERRL  EYES: Sclera and conjunctiva clear; pupils round and reactive to light. NECK: Supple; no masses; no lymphadenopathy. LUNGS: Clear to auscultation bilaterally, no wheezes, rales and rhonchi. CARDIOVASCULAR: Regular rate and rhythm without murmurs, gallops or rubs. NEUROLOGIC: Speech intact; face symmetrical; moves all extremities equally. Assessment/Plan    ICD-10-CM ICD-9-CM    1. Stress at work  Z56.6 V62.1    2. Type 2 diabetes mellitus without complication, without long-term current use of insulin ([facility])  E11.9 250.00 HEMOGLOBIN A1C WITH EAG      MICROALBUMIN, UR, RAND W/ MICROALB/CREAT RATIO      MICROALBUMIN, UR, RAND W/ MICROALB/CREAT RATIO      HEMOGLOBIN A1C WITH EAG   3. Primary hypertension  I10 401.9      1. Stress at work  Pt reports having a mental breakdown at home. Denies being depressed but stressed and needs a break. No SI/HI. Will fill out work paperwork.     2. Type 2 diabetes mellitus without complication, without long-term current use of insulin ([facility] [address]  Non-compliant with his meds  - HEMOGLOBIN A1C WITH EAG; Future  - MICROALBUMIN, UR, RAND W/ MICROALB/CREAT RATIO; Future  - encouraged him to continue taking his medication    3. Primary hypertension  Noncompliant. Encouraged pt to resume his medications      Follow up: 3-6 months or sooner if needed. On this date 12/02/21 I have spent 25 minutes reviewing previous notes, test results and face to face  with the patient discussing the diagnosis and importance of compliance with the treatment plan as well as documenting on the day of the visit. I have discussed the diagnosis with the patient and the intended plan as seen in the above orders. Patient verbalized understanding of the plan and agrees with the plan. The patient has received an after-visit summary and questions were answered concerning future plans. I have discussed medication side effects and warnings with the patient as well. Informed patient to return to the office if worsening or  new symptoms arise.     Signed By: Ana Aguilera MD     December 2, 2021

## 2021-12-02 NOTE — PROGRESS NOTES
Chief Complaint   Patient presents with    Hypertension    Stress     Pt stopped atorvastatin, metformin, and lisinopril around sept. Pt would like to discuss Short term disability. 1. Have you been to the ER, urgent care clinic since your last visit? Hospitalized since your last visit? No    2. Have you seen or consulted any other health care providers outside of the [address] [place] [address] since your last visit? Include any pap smears or colon screening.  No     Flu shot - Pt declined    Health Maintenance Due   Topic Date Due    COVID-19 Vaccine (1) Never done    Pneumococcal 0-64 years (1 of 2 - PPSV23) Never done    MICROALBUMIN Q1  Never done    Eye Exam Retinal or Dilated  Never done    Flu Vaccine (1) Never done

## 2022-02-18 ENCOUNTER — OFFICE VISIT (OUTPATIENT)
Dept: FAMILY MEDICINE CLINIC | Age: 37
End: 2022-02-18

## 2022-02-18 VITALS
BODY MASS INDEX: 37.74 KG/M2 | HEIGHT: 68 IN | RESPIRATION RATE: 16 BRPM | DIASTOLIC BLOOD PRESSURE: 86 MMHG | HEART RATE: 80 BPM | WEIGHT: 249 LBS | OXYGEN SATURATION: 97 % | TEMPERATURE: 96.9 F | SYSTOLIC BLOOD PRESSURE: 122 MMHG

## 2022-02-18 DIAGNOSIS — E11.9 DIABETES MELLITUS TYPE 2, DIET-CONTROLLED (HCC): ICD-10-CM

## 2022-02-18 DIAGNOSIS — E11.59 HYPERTENSION ASSOCIATED WITH DIABETES (HCC): Primary | ICD-10-CM

## 2022-02-18 DIAGNOSIS — I15.2 HYPERTENSION ASSOCIATED WITH DIABETES (HCC): Primary | ICD-10-CM

## 2022-02-18 PROCEDURE — 99213 OFFICE O/P EST LOW 20 MIN: CPT | Performed by: STUDENT IN AN ORGANIZED HEALTH CARE EDUCATION/TRAINING PROGRAM

## 2022-02-18 NOTE — PROGRESS NOTES
Name and  Verified. Pharmacy verified    Chief Complaint   Patient presents with    Follow-up     6 months  Diabetes/ Hypertension       Patient has not been taken any prescribed medication for 3-4 months. 1. Have you been to the ER, urgent care clinic since your last visit? Hospitalized since your last visit? No    2. Have you seen or consulted any other health care providers outside of the [address] [place] [address] since your last visit? Include any pap smears or colon screening. No      Health Maintenance Due   Topic Date Due    COVID-19 Vaccine (1) Never done    Pneumococcal 0-64 years (1 of 2 - PPSV23) Never done    Eye Exam Retinal or Dilated  Never done     Will have vaccines done at pharmacy.

## 2022-02-18 NOTE — PROGRESS NOTES
[address]  [address] [facility], [facility]  [phone]    Chief Complaint: HTN and Diabetes    Subjective  Kuldeep Li is a 40 y.o. male who presents to clinic today for follow up;    HTN:  Non compliant. Stopped taking all his medications in 09/2021  States that he changed his lifestyle. States that he checks it about once per week and it looks good. Diabetes: Diagnosed (5/2021)  Non compliant. Pt stopped taking all his medications in 09/2021  Does not check sugar at home. No polyuria, polydipsia, or visual problems. SHx:   Single with 2 sons. Works at a [employer]. Smoking - 2 cigarettes per day  Alcohol - No    Allergies - reviewed:   No Known Allergies    Medications - reviewed:   Current Outpatient Medications   Medication Sig    lisinopril-hydroCHLOROthiazide (PRINZIDE, ZESTORETIC) 20-12.5 mg per tablet Take 1 Tablet by mouth daily. (Patient not taking: Reported on 12/2/2021)    atorvastatin (LIPITOR) 40 mg tablet Take 1 Tablet by mouth daily. (Patient not taking: Reported on 12/2/2021)    metFORMIN ER (GLUCOPHAGE XR) 500 mg tablet Take 1 Tablet by mouth two (2) times daily (with meals). (Patient not taking: Reported on 12/2/2021)    cholecalciferol, VITAMIN D3, (VITAMIN D3) 5,000 unit tab tablet Take 1 Tab by mouth daily. (Patient not taking: Reported on 2/18/2022)     No current facility-administered medications for this visit.        Past Medical History - reviewed:  Past Medical History:   Diagnosis Date    Headache        Social History - reviewed:  Social History     Socioeconomic History    Marital status: SINGLE     Spouse name: Not on file    Number of children: Not on file    Years of education: Not on file    Highest education level: Not on file   Occupational History    Not on file   Tobacco Use    Smoking status: Current Some Day Smoker     Packs/day: 0.10     Types: Cigarettes    Smokeless tobacco: Never Used    Tobacco comment: 1-2 daily   Vaping Use    Vaping Use: Never used   Substance and Sexual Activity    Alcohol use: No     Comment: Rarely. Smokes cigar's one a day    Drug use: Yes     Types: Marijuana     Comment: occ    Sexual activity: Yes     Partners: Female   Other Topics Concern    Not on file   Social History Narrative    Not on file     Social Determinants of Health     Financial Resource Strain:     Difficulty of Paying Living Expenses: Not on file   Food Insecurity:     Worried About Running Out of Food in the Last Year: Not on file    Chante of Food in the Last Year: Not on file   Transportation Needs:     Lack of Transportation (Medical): Not on file    Lack of Transportation (Non-Medical):  Not on file   Physical Activity:     Days of Exercise per Week: Not on file    Minutes of Exercise per Session: Not on file   Stress:     Feeling of Stress : Not on file   Social Connections:     Frequency of Communication with Friends and Family: Not on file    Frequency of Social Gatherings with Friends and Family: Not on file    Attends Hindu Services: Not on file    Active Member of [address] [place] [address] or Organizations: Not on file    Attends Club or Organization Meetings: Not on file    Marital Status: Not on file   Intimate Partner Violence:     Fear of Current or Ex-Partner: Not on file    Emotionally Abused: Not on file    Physically Abused: Not on file    Sexually Abused: Not on file   Housing Stability:     Unable to Pay for Housing in the Last Year: Not on file    Number of [place] in the Last Year: Not on file    Unstable Housing in the Last Year: Not on file       Family History - reviewed:  Family History   Problem Relation Age of Onset    Hypertension Mother     Hypertension Father     No Known Problems Sister     No Known Problems Brother          ROS  CONSTITUTIONAL: Denies: fever, chills, weakness  EYES: Denies: blurry vision, eye pain, photophobia  CARDIOVASCULAR: Denies: chest pain, dyspnea on exertion, palpitations  RESPIRATORY: Denies: cough, shortness of breath      Physical Exam  Visit Vitals  /86 (BP 1 Location: Right upper arm, BP Patient Position: Sitting, BP Cuff Size: Large adult)   Pulse 80   Temp 96.9 °F (36.1 °C) (Oral)   Resp 16   Ht 5' 8\" (1.727 m)   Wt 249 lb (112.9 kg)   SpO2 97%   BMI 37.86 kg/m²       General: Alert and oriented and in no acute distress. SKIN: No rash. HEAD: Normocephalic, atraumatic, PERRL  EYES: Sclera and conjunctiva clear; pupils round and reactive to light. NECK: Supple; no masses; no lymphadenopathy. LUNGS: Clear to auscultation bilaterally, no wheezes, rales and rhonchi. CARDIOVASCULAR: Regular rate and rhythm without murmurs, gallops or rubs. NEUROLOGIC: Speech intact; face symmetrical; moves all extremities equally. Assessment/Plan    ICD-10-CM ICD-9-CM    1. Hypertension associated with diabetes ([address]  E11.59 250.80     I15.2 401.9    2. Diabetes mellitus type 2, diet-controlled ([facility])  E11.9 250.00        1. Hypertension associated with diabetes ([address]  Non compliant. BP looks ok today. Encouraged to continue monitoring at home. 2. Diabetes mellitus type 2, diet-controlled ([address]  Noncompliant. Would like to continue working on lifestyle changes which is reasonable given A1C 6 at last check. Follow up: 3-6 months or sooner if needed. I have discussed the diagnosis with the patient and the intended plan as seen in the above orders. Patient verbalized understanding of the plan and agrees with the plan. The patient has received an after-visit summary and questions were answered concerning future plans. I have discussed medication side effects and warnings with the patient as well. Informed patient to return to the office if worsening or  new symptoms arise.     Signed By: Tahira Mallory MD     February 18, 2022

## 2022-03-19 PROBLEM — E66.01 OBESITY, MORBID (HCC): Status: ACTIVE | Noted: 2018-02-12

## 2023-05-07 ENCOUNTER — HOSPITAL ENCOUNTER (EMERGENCY)
Facility: HOSPITAL | Age: 38
Discharge: HOME | End: 2023-05-07
Attending: STUDENT IN AN ORGANIZED HEALTH CARE EDUCATION/TRAINING PROGRAM

## 2023-05-07 VITALS
WEIGHT: 276.68 LBS | HEART RATE: 86 BPM | DIASTOLIC BLOOD PRESSURE: 100 MMHG | BODY MASS INDEX: 41.93 KG/M2 | SYSTOLIC BLOOD PRESSURE: 147 MMHG | TEMPERATURE: 98.6 F | HEIGHT: 68 IN | RESPIRATION RATE: 14 BRPM | OXYGEN SATURATION: 97 %

## 2023-05-07 DIAGNOSIS — I10 ESSENTIAL HYPERTENSION: ICD-10-CM

## 2023-05-07 DIAGNOSIS — J03.90 ACUTE TONSILLITIS, UNSPECIFIED ETIOLOGY: Primary | ICD-10-CM

## 2023-05-07 DIAGNOSIS — F17.200 SMOKING ADDICTION: ICD-10-CM

## 2023-05-07 LAB — DEPRECATED S PYO AG THROAT QL EIA: NEGATIVE

## 2023-05-07 PROCEDURE — 99283 EMERGENCY DEPT VISIT LOW MDM: CPT

## 2023-05-07 PROCEDURE — 87147 CULTURE TYPE IMMUNOLOGIC: CPT

## 2023-05-07 PROCEDURE — 87070 CULTURE OTHR SPECIMN AEROBIC: CPT

## 2023-05-07 PROCEDURE — 87880 STREP A ASSAY W/OPTIC: CPT

## 2023-05-07 RX ORDER — CETIRIZINE HYDROCHLORIDE 10 MG/1
10 TABLET ORAL DAILY
Qty: 10 TABLET | Refills: 0 | Status: SHIPPED | OUTPATIENT
Start: 2023-05-07 | End: 2023-05-17

## 2023-05-07 RX ORDER — IBUPROFEN 800 MG/1
800 TABLET ORAL EVERY 8 HOURS PRN
Qty: 20 TABLET | Refills: 3 | Status: SHIPPED | OUTPATIENT
Start: 2023-05-07

## 2023-05-07 RX ORDER — PREDNISONE 5 MG/1
TABLET ORAL
Qty: 1 EACH | Refills: 0 | Status: SHIPPED | OUTPATIENT
Start: 2023-05-07

## 2023-05-07 ASSESSMENT — LIFESTYLE VARIABLES
HOW OFTEN DO YOU HAVE A DRINK CONTAINING ALCOHOL: NEVER
HOW MANY STANDARD DRINKS CONTAINING ALCOHOL DO YOU HAVE ON A TYPICAL DAY: PATIENT DOES NOT DRINK

## 2023-05-07 ASSESSMENT — ENCOUNTER SYMPTOMS
TROUBLE SWALLOWING: 0
COUGH: 0
SORE THROAT: 1

## 2023-05-07 ASSESSMENT — PAIN SCALES - GENERAL: PAINLEVEL_OUTOF10: 7

## 2023-05-08 LAB
BACTERIA SPEC CULT: ABNORMAL
BACTERIA SPEC CULT: ABNORMAL
SERVICE CMNT-IMP: ABNORMAL

## 2024-06-18 ENCOUNTER — APPOINTMENT (OUTPATIENT)
Facility: HOSPITAL | Age: 39
End: 2024-06-18
Payer: COMMERCIAL

## 2024-06-18 ENCOUNTER — HOSPITAL ENCOUNTER (EMERGENCY)
Facility: HOSPITAL | Age: 39
Discharge: HOME | End: 2024-06-18
Attending: EMERGENCY MEDICINE
Payer: COMMERCIAL

## 2024-06-18 VITALS
RESPIRATION RATE: 20 BRPM | OXYGEN SATURATION: 98 % | HEART RATE: 77 BPM | BODY MASS INDEX: 39.15 KG/M2 | HEIGHT: 69 IN | SYSTOLIC BLOOD PRESSURE: 147 MMHG | TEMPERATURE: 97.9 F | DIASTOLIC BLOOD PRESSURE: 100 MMHG | WEIGHT: 264.33 LBS

## 2024-06-18 DIAGNOSIS — M25.531 RIGHT WRIST PAIN: Primary | ICD-10-CM

## 2024-06-18 PROCEDURE — 6370000000 HC RX 637 (ALT 250 FOR IP): Performed by: EMERGENCY MEDICINE

## 2024-06-18 PROCEDURE — 99283 EMERGENCY DEPT VISIT LOW MDM: CPT

## 2024-06-18 PROCEDURE — 73110 X-RAY EXAM OF WRIST: CPT

## 2024-06-18 RX ORDER — IBUPROFEN 800 MG/1
800 TABLET ORAL EVERY 8 HOURS PRN
Qty: 24 TABLET | Refills: 0 | Status: SHIPPED | OUTPATIENT
Start: 2024-06-18

## 2024-06-18 RX ORDER — HYDROCODONE BITARTRATE AND ACETAMINOPHEN 5; 325 MG/1; MG/1
1 TABLET ORAL EVERY 6 HOURS PRN
Qty: 12 TABLET | Refills: 0 | Status: SHIPPED | OUTPATIENT
Start: 2024-06-18 | End: 2024-06-21

## 2024-06-18 RX ORDER — IBUPROFEN 400 MG/1
800 TABLET ORAL ONCE
Status: COMPLETED | OUTPATIENT
Start: 2024-06-18 | End: 2024-06-18

## 2024-06-18 RX ADMIN — IBUPROFEN 800 MG: 400 TABLET, FILM COATED ORAL at 06:14

## 2024-06-18 ASSESSMENT — PAIN DESCRIPTION - DESCRIPTORS: DESCRIPTORS: SQUEEZING;TIGHTNESS

## 2024-06-18 ASSESSMENT — PAIN DESCRIPTION - PAIN TYPE: TYPE: ACUTE PAIN

## 2024-06-18 ASSESSMENT — LIFESTYLE VARIABLES
HOW MANY STANDARD DRINKS CONTAINING ALCOHOL DO YOU HAVE ON A TYPICAL DAY: 1 OR 2
HOW OFTEN DO YOU HAVE A DRINK CONTAINING ALCOHOL: MONTHLY OR LESS

## 2024-06-18 ASSESSMENT — PAIN DESCRIPTION - LOCATION: LOCATION: HAND;WRIST

## 2024-06-18 ASSESSMENT — PAIN DESCRIPTION - ORIENTATION: ORIENTATION: RIGHT

## 2024-06-18 ASSESSMENT — PAIN SCALES - GENERAL: PAINLEVEL_OUTOF10: 7

## 2024-06-18 ASSESSMENT — PAIN DESCRIPTION - ONSET: ONSET: PROGRESSIVE

## 2024-06-18 ASSESSMENT — PAIN - FUNCTIONAL ASSESSMENT: PAIN_FUNCTIONAL_ASSESSMENT: 0-10

## 2024-06-18 ASSESSMENT — PAIN DESCRIPTION - FREQUENCY: FREQUENCY: CONTINUOUS

## 2024-06-18 NOTE — ED NOTES
Bedside and Verbal shift change report given to Aydee RN (oncoming nurse) by Hallie RN (offgoing nurse). Report included the following information ED Encounter Summary, ED SBAR, Adult Overview, Recent Results, and Neuro Assessment.

## 2024-06-18 NOTE — ED PROVIDER NOTES
[facility] EMERGENCY DEPT  EMERGENCY DEPARTMENT ENCOUNTER       Pt Name: aBrt Purcell  MRN: [id number]  Birthdate 1985  Date of evaluation: 2024  Provider: Zohaib Pinzon DO   PCP: Pipe Gonzalez MD  Note Started: 6:01 AM EDT 24     CHIEF COMPLAINT       Chief Complaint   Patient presents with    Hand Pain     Patient reports hand cramping and pain that started a week ago with no known injury. Patient does a lot of typing for work so he thought it might be carpal tunnel so he went out and bought a brace. Patient has not felt any relief. Took Tylenol 650mg around 0400 with no relief. Patient's blood pressure is elevated at this time with no history of hypertension.        HISTORY OF PRESENT ILLNESS: 1 or more elements      History From: Patient, History limited by: none     Bart Purcell is a 39 y.o. male presenting the emergency department complaining of right hand pain and right wrist pain.  Its mostly over the medial portion of the right wrist overlying the distal ulna.  No falls trauma or injury.  No chest pain or shortness of breath.  Pain is worse with movement, unrelieved by a splint, unrelieved by Tylenol.       Please See MDM for Additional Details of the HPI/PMH  Nursing Notes were all reviewed and agreed with or any disagreements were addressed in the HPI.     REVIEW OF SYSTEMS        Positives and Pertinent negatives as per HPI.    PAST HISTORY     Past Medical History:  Past Medical History:   Diagnosis Date    Headache        Past Surgical History:  History reviewed. No pertinent surgical history.    Family History:  Family History   Problem Relation Age of Onset    Hypertension Father     No Known Problems Sister     No Known Problems Brother     Hypertension Mother        Social History:  Social History     Tobacco Use    Smoking status: Some Days     Current packs/day: 0.10     Types: Cigarettes    Smokeless tobacco: Never    Tobacco comments:     Quit smokin-2 daily   Substance Use

## 2024-06-18 NOTE — ED NOTES
RN reviewed discharge instructions with the patient. The pt verbalized understanding. All questions and concerns were addressed. The patient is discharged with papers in hand.  Pt is alert and oriented. Respirations are clear and unlabored. Patient has been made aware of prescription(s) called into pharmacy for .